# Patient Record
Sex: MALE | Race: WHITE | Employment: FULL TIME | URBAN - METROPOLITAN AREA
[De-identification: names, ages, dates, MRNs, and addresses within clinical notes are randomized per-mention and may not be internally consistent; named-entity substitution may affect disease eponyms.]

---

## 2017-01-23 ENCOUNTER — OFFICE VISIT (OUTPATIENT)
Dept: DERMATOLOGY | Age: 72
End: 2017-01-23

## 2017-01-23 DIAGNOSIS — L57.0 AK (ACTINIC KERATOSIS): ICD-10-CM

## 2017-01-23 DIAGNOSIS — L82.1 SEBORRHEIC KERATOSIS: ICD-10-CM

## 2017-01-23 DIAGNOSIS — L30.9 DERMATITIS: ICD-10-CM

## 2017-01-23 DIAGNOSIS — D22.9 MULTIPLE NEVI: Primary | ICD-10-CM

## 2017-01-23 PROCEDURE — 17003 DESTRUCT PREMALG LES 2-14: CPT | Performed by: DERMATOLOGY

## 2017-01-23 PROCEDURE — 17000 DESTRUCT PREMALG LESION: CPT | Performed by: DERMATOLOGY

## 2017-01-23 PROCEDURE — 99213 OFFICE O/P EST LOW 20 MIN: CPT | Performed by: DERMATOLOGY

## 2017-03-08 DIAGNOSIS — E78.2 MIXED HYPERLIPIDEMIA: Primary | ICD-10-CM

## 2017-03-08 RX ORDER — ATORVASTATIN CALCIUM 80 MG/1
TABLET, FILM COATED ORAL
Qty: 90 TABLET | Refills: 1 | Status: SHIPPED | OUTPATIENT
Start: 2017-03-08 | End: 2017-08-29 | Stop reason: SDUPTHER

## 2017-04-24 DIAGNOSIS — E78.2 MIXED HYPERLIPIDEMIA: ICD-10-CM

## 2017-04-24 RX ORDER — FENOFIBRATE 160 MG/1
160 TABLET ORAL DAILY
Qty: 90 TABLET | Refills: 1 | Status: SHIPPED | OUTPATIENT
Start: 2017-04-24 | End: 2017-10-16 | Stop reason: SDUPTHER

## 2017-05-08 DIAGNOSIS — R73.9 HYPERGLYCEMIA: ICD-10-CM

## 2017-05-08 DIAGNOSIS — E78.2 MIXED HYPERLIPIDEMIA: Primary | ICD-10-CM

## 2017-05-26 DIAGNOSIS — E78.2 MIXED HYPERLIPIDEMIA: ICD-10-CM

## 2017-05-26 DIAGNOSIS — R73.9 HYPERGLYCEMIA: ICD-10-CM

## 2017-05-26 LAB
A/G RATIO: 2.2 (ref 1.1–2.2)
ALBUMIN SERPL-MCNC: 4.4 G/DL (ref 3.4–5)
ALP BLD-CCNC: 48 U/L (ref 40–129)
ALT SERPL-CCNC: 22 U/L (ref 10–40)
ANION GAP SERPL CALCULATED.3IONS-SCNC: 11 MMOL/L (ref 3–16)
AST SERPL-CCNC: 21 U/L (ref 15–37)
BILIRUB SERPL-MCNC: 0.6 MG/DL (ref 0–1)
BUN BLDV-MCNC: 17 MG/DL (ref 7–20)
CALCIUM SERPL-MCNC: 9 MG/DL (ref 8.3–10.6)
CHLORIDE BLD-SCNC: 108 MMOL/L (ref 99–110)
CHOLESTEROL, TOTAL: 127 MG/DL (ref 0–199)
CO2: 26 MMOL/L (ref 21–32)
CREAT SERPL-MCNC: 1 MG/DL (ref 0.8–1.3)
GFR AFRICAN AMERICAN: >60
GFR NON-AFRICAN AMERICAN: >60
GLOBULIN: 2 G/DL
GLUCOSE BLD-MCNC: 117 MG/DL (ref 70–99)
HDLC SERPL-MCNC: 45 MG/DL (ref 40–60)
LDL CHOLESTEROL CALCULATED: 64 MG/DL
POTASSIUM SERPL-SCNC: 4.6 MMOL/L (ref 3.5–5.1)
SODIUM BLD-SCNC: 145 MMOL/L (ref 136–145)
TOTAL PROTEIN: 6.4 G/DL (ref 6.4–8.2)
TRIGL SERPL-MCNC: 92 MG/DL (ref 0–150)
VLDLC SERPL CALC-MCNC: 18 MG/DL

## 2017-05-27 LAB
ESTIMATED AVERAGE GLUCOSE: 137 MG/DL
HBA1C MFR BLD: 6.4 %

## 2017-06-05 ENCOUNTER — OFFICE VISIT (OUTPATIENT)
Dept: FAMILY MEDICINE CLINIC | Age: 72
End: 2017-06-05

## 2017-06-05 VITALS
SYSTOLIC BLOOD PRESSURE: 130 MMHG | BODY MASS INDEX: 30.42 KG/M2 | DIASTOLIC BLOOD PRESSURE: 84 MMHG | WEIGHT: 212 LBS | TEMPERATURE: 97.6 F

## 2017-06-05 DIAGNOSIS — E78.2 MIXED HYPERLIPIDEMIA: ICD-10-CM

## 2017-06-05 DIAGNOSIS — I10 ESSENTIAL HYPERTENSION: ICD-10-CM

## 2017-06-05 DIAGNOSIS — F32.0 MILD SINGLE CURRENT EPISODE OF MAJOR DEPRESSIVE DISORDER (HCC): ICD-10-CM

## 2017-06-05 DIAGNOSIS — E11.9 CONTROLLED TYPE 2 DIABETES MELLITUS WITHOUT COMPLICATION, WITHOUT LONG-TERM CURRENT USE OF INSULIN (HCC): Primary | ICD-10-CM

## 2017-06-05 LAB
CREATININE URINE POCT: NORMAL
MICROALBUMIN/CREAT 24H UR: NORMAL MG/G{CREAT}
MICROALBUMIN/CREAT UR-RTO: NORMAL

## 2017-06-05 PROCEDURE — 82044 UR ALBUMIN SEMIQUANTITATIVE: CPT | Performed by: FAMILY MEDICINE

## 2017-06-05 PROCEDURE — 99214 OFFICE O/P EST MOD 30 MIN: CPT | Performed by: FAMILY MEDICINE

## 2017-06-06 ASSESSMENT — ENCOUNTER SYMPTOMS
EYES NEGATIVE: 1
GASTROINTESTINAL NEGATIVE: 1
RESPIRATORY NEGATIVE: 1

## 2017-06-15 DIAGNOSIS — I10 ESSENTIAL HYPERTENSION: ICD-10-CM

## 2017-07-11 DIAGNOSIS — I10 ESSENTIAL HYPERTENSION: ICD-10-CM

## 2017-08-29 DIAGNOSIS — E78.2 MIXED HYPERLIPIDEMIA: ICD-10-CM

## 2017-08-29 RX ORDER — ATORVASTATIN CALCIUM 80 MG/1
80 TABLET, FILM COATED ORAL DAILY
Qty: 90 TABLET | Refills: 1 | Status: SHIPPED | OUTPATIENT
Start: 2017-08-29 | End: 2018-02-22 | Stop reason: SDUPTHER

## 2017-09-06 DIAGNOSIS — E11.9 CONTROLLED TYPE 2 DIABETES MELLITUS WITHOUT COMPLICATION, WITHOUT LONG-TERM CURRENT USE OF INSULIN (HCC): ICD-10-CM

## 2017-09-06 DIAGNOSIS — E78.2 MIXED HYPERLIPIDEMIA: ICD-10-CM

## 2017-09-06 LAB
A/G RATIO: 2.2 (ref 1.1–2.2)
ALBUMIN SERPL-MCNC: 4.4 G/DL (ref 3.4–5)
ALP BLD-CCNC: 52 U/L (ref 40–129)
ALT SERPL-CCNC: 32 U/L (ref 10–40)
ANION GAP SERPL CALCULATED.3IONS-SCNC: 12 MMOL/L (ref 3–16)
AST SERPL-CCNC: 29 U/L (ref 15–37)
BILIRUB SERPL-MCNC: 0.8 MG/DL (ref 0–1)
BUN BLDV-MCNC: 19 MG/DL (ref 7–20)
CALCIUM SERPL-MCNC: 9.2 MG/DL (ref 8.3–10.6)
CHLORIDE BLD-SCNC: 106 MMOL/L (ref 99–110)
CHOLESTEROL, TOTAL: 127 MG/DL (ref 0–199)
CO2: 26 MMOL/L (ref 21–32)
CREAT SERPL-MCNC: 0.9 MG/DL (ref 0.8–1.3)
ESTIMATED AVERAGE GLUCOSE: 122.6 MG/DL
GFR AFRICAN AMERICAN: >60
GFR NON-AFRICAN AMERICAN: >60
GLOBULIN: 2 G/DL
GLUCOSE BLD-MCNC: 96 MG/DL (ref 70–99)
HBA1C MFR BLD: 5.9 %
HDLC SERPL-MCNC: 48 MG/DL (ref 40–60)
LDL CHOLESTEROL CALCULATED: 65 MG/DL
POTASSIUM SERPL-SCNC: 4.7 MMOL/L (ref 3.5–5.1)
SODIUM BLD-SCNC: 144 MMOL/L (ref 136–145)
TOTAL PROTEIN: 6.4 G/DL (ref 6.4–8.2)
TRIGL SERPL-MCNC: 71 MG/DL (ref 0–150)
VLDLC SERPL CALC-MCNC: 14 MG/DL

## 2017-09-19 ENCOUNTER — OFFICE VISIT (OUTPATIENT)
Dept: FAMILY MEDICINE CLINIC | Age: 72
End: 2017-09-19

## 2017-09-19 VITALS
TEMPERATURE: 97.5 F | DIASTOLIC BLOOD PRESSURE: 62 MMHG | HEIGHT: 66 IN | BODY MASS INDEX: 31.63 KG/M2 | WEIGHT: 196.8 LBS | HEART RATE: 70 BPM | OXYGEN SATURATION: 97 % | SYSTOLIC BLOOD PRESSURE: 130 MMHG

## 2017-09-19 DIAGNOSIS — L30.9 ECZEMA, UNSPECIFIED TYPE: Primary | ICD-10-CM

## 2017-09-19 DIAGNOSIS — I10 ESSENTIAL HYPERTENSION: ICD-10-CM

## 2017-09-19 DIAGNOSIS — R73.9 HYPERGLYCEMIA: ICD-10-CM

## 2017-09-19 DIAGNOSIS — E78.2 MIXED HYPERLIPIDEMIA: ICD-10-CM

## 2017-09-19 DIAGNOSIS — Z23 NEED FOR 23-POLYVALENT PNEUMOCOCCAL POLYSACCHARIDE VACCINE: ICD-10-CM

## 2017-09-19 DIAGNOSIS — Z23 NEED FOR INFLUENZA VACCINATION: ICD-10-CM

## 2017-09-19 PROCEDURE — 90732 PPSV23 VACC 2 YRS+ SUBQ/IM: CPT | Performed by: NURSE PRACTITIONER

## 2017-09-19 PROCEDURE — 90472 IMMUNIZATION ADMIN EACH ADD: CPT | Performed by: NURSE PRACTITIONER

## 2017-09-19 PROCEDURE — 90471 IMMUNIZATION ADMIN: CPT | Performed by: NURSE PRACTITIONER

## 2017-09-19 PROCEDURE — 90662 IIV NO PRSV INCREASED AG IM: CPT | Performed by: NURSE PRACTITIONER

## 2017-09-19 PROCEDURE — 99214 OFFICE O/P EST MOD 30 MIN: CPT | Performed by: NURSE PRACTITIONER

## 2017-09-19 RX ORDER — MOMETASONE FUROATE 1 MG/G
OINTMENT TOPICAL
Qty: 1 TUBE | Refills: 1 | Status: SHIPPED | OUTPATIENT
Start: 2017-09-19

## 2017-09-20 ASSESSMENT — ENCOUNTER SYMPTOMS
SHORTNESS OF BREATH: 0
ALLERGIC/IMMUNOLOGIC NEGATIVE: 1
ORTHOPNEA: 0
BLURRED VISION: 0
VOMITING: 0
EYES NEGATIVE: 1
RESPIRATORY NEGATIVE: 1
SORE THROAT: 0
EYE PAIN: 0
RHINORRHEA: 0
GASTROINTESTINAL NEGATIVE: 1
COUGH: 0
NAIL CHANGES: 0
DIARRHEA: 0

## 2017-10-16 DIAGNOSIS — E78.2 MIXED HYPERLIPIDEMIA: ICD-10-CM

## 2017-10-16 RX ORDER — FENOFIBRATE 160 MG/1
160 TABLET ORAL DAILY
Qty: 90 TABLET | Refills: 1 | Status: SHIPPED | OUTPATIENT
Start: 2017-10-16 | End: 2018-04-13 | Stop reason: SDUPTHER

## 2017-10-17 ENCOUNTER — PATIENT MESSAGE (OUTPATIENT)
Dept: FAMILY MEDICINE CLINIC | Age: 72
End: 2017-10-17

## 2017-10-18 RX ORDER — NITROGLYCERIN 400 UG/1
SPRAY ORAL
Qty: 1 BOTTLE | Refills: 5 | Status: SHIPPED | OUTPATIENT
Start: 2017-10-18 | End: 2017-10-24 | Stop reason: CLARIF

## 2017-10-24 ENCOUNTER — TELEPHONE (OUTPATIENT)
Dept: FAMILY MEDICINE CLINIC | Age: 72
End: 2017-10-24

## 2017-10-24 DIAGNOSIS — I25.10 CORONARY ARTERY DISEASE INVOLVING NATIVE CORONARY ARTERY OF NATIVE HEART WITHOUT ANGINA PECTORIS: Primary | ICD-10-CM

## 2017-10-24 RX ORDER — NITROGLYCERIN 0.3 MG/1
0.3 TABLET SUBLINGUAL EVERY 5 MIN PRN
Qty: 30 TABLET | Refills: 3 | Status: SHIPPED | OUTPATIENT
Start: 2017-10-24 | End: 2018-07-17 | Stop reason: SDUPTHER

## 2017-10-24 NOTE — TELEPHONE ENCOUNTER
Fax request from Pharmacy requesting alternative to the nitroglycerin spray. It is not covered by [employer] and costs more than $100. Is there an alternative?   Thanks

## 2017-10-25 ENCOUNTER — TELEPHONE (OUTPATIENT)
Dept: FAMILY MEDICINE CLINIC | Age: 72
End: 2017-10-25

## 2017-10-25 DIAGNOSIS — I25.10 CORONARY ARTERY DISEASE INVOLVING NATIVE CORONARY ARTERY OF NATIVE HEART WITHOUT ANGINA PECTORIS: Primary | ICD-10-CM

## 2017-10-25 NOTE — TELEPHONE ENCOUNTER
[facility] - Neto Euceda MD  [address] [place], [facility] [address] [facility], [place], [address]  Ph: [phone]  One of the many advantages of the [facility] [address] is that we all work together closely to make healthcare easy for you. We have contacted the physician practice to inform them we have referred you.  For your convenience, their office should call you within a few days to schedule   an appointment, but if you would like to call them sooner their information is above    Pt's wife [email or web address]

## 2017-11-27 ENCOUNTER — TELEPHONE (OUTPATIENT)
Dept: DERMATOLOGY | Age: 72
End: 2017-11-27

## 2017-11-27 NOTE — TELEPHONE ENCOUNTER
Vishnu Godinez called to ask if he can get in to see Dr. Aurelia Pinto for an itchy rash on both of his legs that is spreading. He isn't scheduled for his yearly until January.  [phone]    He also requested a refill on triamcinolone (KENALOG) 0.1 % cream.   Pharmacy:  [facility] - P [phone]

## 2017-11-29 NOTE — TELEPHONE ENCOUNTER
Patient scheduled for rash on his arms, legs and some on back for quite awhile and seems to be spreading to his hands.

## 2017-11-30 ENCOUNTER — OFFICE VISIT (OUTPATIENT)
Dept: DERMATOLOGY | Age: 72
End: 2017-11-30

## 2017-11-30 VITALS — BODY MASS INDEX: 31.47 KG/M2 | WEIGHT: 195 LBS

## 2017-11-30 DIAGNOSIS — L30.9 DERMATITIS: Primary | ICD-10-CM

## 2017-11-30 PROCEDURE — 1036F TOBACCO NON-USER: CPT | Performed by: DERMATOLOGY

## 2017-11-30 PROCEDURE — G8598 ASA/ANTIPLAT THER USED: HCPCS | Performed by: DERMATOLOGY

## 2017-11-30 PROCEDURE — 4040F PNEUMOC VAC/ADMIN/RCVD: CPT | Performed by: DERMATOLOGY

## 2017-11-30 PROCEDURE — 96372 THER/PROPH/DIAG INJ SC/IM: CPT | Performed by: DERMATOLOGY

## 2017-11-30 PROCEDURE — 3017F COLORECTAL CA SCREEN DOC REV: CPT | Performed by: DERMATOLOGY

## 2017-11-30 PROCEDURE — 1123F ACP DISCUSS/DSCN MKR DOCD: CPT | Performed by: DERMATOLOGY

## 2017-11-30 PROCEDURE — G8417 CALC BMI ABV UP PARAM F/U: HCPCS | Performed by: DERMATOLOGY

## 2017-11-30 PROCEDURE — G8427 DOCREV CUR MEDS BY ELIG CLIN: HCPCS | Performed by: DERMATOLOGY

## 2017-11-30 PROCEDURE — G8484 FLU IMMUNIZE NO ADMIN: HCPCS | Performed by: DERMATOLOGY

## 2017-11-30 PROCEDURE — 99213 OFFICE O/P EST LOW 20 MIN: CPT | Performed by: DERMATOLOGY

## 2017-11-30 RX ORDER — TRIAMCINOLONE ACETONIDE 40 MG/ML
80 INJECTION, SUSPENSION INTRA-ARTICULAR; INTRAMUSCULAR ONCE
Status: SHIPPED | OUTPATIENT
Start: 2017-11-30

## 2017-11-30 NOTE — PROGRESS NOTES
[facility]  Willma Cooks, MD  [address]  1945    71 y.o. male     Date of Visit: 2017    Chief Complaint: moles, f/u AK's  Chief Complaint   Patient presents with    Rash     x 1.5 mos ago arms, legs, back, feet really itchy-prev treated with triamcinolone ()     Last seen:   *continues to have a rough year since last seen - his wife has parkinson's and has had probs with restless legs     History of Present Illness:    Here for ~ 2 month hx of pruritic eruption on the legs, forearms, L and R abdomen and back. No improvement with mometasone ointment. No other trx tried. Not using moisturizer. Has had asteatotic and nummular dermatitis flares in the past.  Atopic dermatitis as a child/teen. Previously has used TAC cream intermittently for flares since last seen with excellent results - improves quickly. No tanning beds. He wears sunscreen regularly. He had a lot of sun exposure as a child and numerous sunburns that he can recall. Review of Systems:  Gen: Feels well, good sense of health. No F/C. Skin: No changing moles or lesions. Past Medical History, Family History, Surgical History, Medications and Allergies reviewed.     Past Medical History:   Diagnosis Date    Asthma     BPH     CAD (coronary artery disease)     Depression     Hyperlipidemia     Hypertension     Mononucleosis        Past Surgical History:   Procedure Laterality Date    ANGIOPLASTY      TONSILLECTOMY AND ADENOIDECTOMY         Outpatient Prescriptions Marked as Taking for the 17 encounter (Office Visit) with Krissy Suazo MD   Medication Sig Dispense Refill    fenofibrate 160 MG tablet Take 1 tablet by mouth daily 90 tablet 1    atorvastatin (LIPITOR) 80 MG tablet Take 1 tablet by mouth daily 90 tablet 1    metoprolol tartrate (LOPRESSOR) 25 MG tablet Take 0.5 tablets by mouth daily 45 tablet 1    Multiple Vitamins-Minerals (THERAPEUTIC MULTIVITAMIN-MINERALS) tablet Take 1 tablet by mouth daily      Vitamin D (CHOLECALCIFEROL) 1000 UNITS CAPS capsule Take 2,000 Units by mouth daily      aspirin 325 MG tablet Take 325 mg by mouth daily. Allergies   Allergen Reactions    Ace Inhibitors          Physical Examination     Gen, well-appearing  The following were examined and determined to be normal: Psych/Neuro, head/face Gums/teeth/lips, Neck, Breast/axilla/chest, Nails/digits and buttocks. The following were examined and determined to be abnormal: RLE, LLE, RUE, LUE, abdomen, back    Distal > proximal LE, arms, torso/back with erythematous eczematous round patches/plaques and crackled/superficially fissured erythematous patches on the shins/ankles    Assessment and Plan     1. Dermatitisnummular and asteatotic - flaring today  - Discussed dry skin care and encouraged daily use of moisturizer  - IM Kenalog today 80 mg  - ed risk mood/appetite/sleep probs  - TAC oint bid to affected areas until clear and prn flares; ed se/misuse    Has FSE scheduled for Jan 2018. Wife is on Humira - had seen another derm but retired - would like her to come here.

## 2017-11-30 NOTE — PATIENT INSTRUCTIONS
DRY SKIN CARE    1. Do not take more than 1 shower or bath each day. Try to spend less than 10 minutes in the shower/bath. 2. Use a moisturizing soap such as Dove, Oil of Olay or Cetaphil. Antibacterial soaps can be too drying. 3. Use soap only on limited areas (face, underarms, groin). Try to avoid using soap on the arms, legs, trunk and back. 4. After showering, pat dry with a towel and generously apply a thick moisturizer all over. 5. If you are able, apply the moisturizer a second time during the day as well. 6. If a prescription cream or ointment has been ordered for you, apply the prescription medication to affected areas after your bath/shower while the skin is still damp, then apply the moisturizer as above. 7. When it comes to moisturizers, the thicker the better. Ointments (such as vaseline) are thicker than creams, and creams are thicker than lotions.  Suggested over-the-counter moisturizers include:    · CeraVe Cream  · Cetaphil Cream  · Lubriderm Cream  · Vaseline (petroleum jelly)  · Aquaphor  · Eucerin Cream  · Neutrogena Moisturizing Cream  · Neutrogena Hand Cream  · Aveeno Moisturizing Cream or Lotion

## 2017-12-01 DIAGNOSIS — F32.0 MILD SINGLE CURRENT EPISODE OF MAJOR DEPRESSIVE DISORDER (HCC): ICD-10-CM

## 2017-12-01 RX ORDER — BUPROPION HYDROCHLORIDE 150 MG/1
150 TABLET ORAL EVERY MORNING
Qty: 90 TABLET | Refills: 1 | Status: SHIPPED | OUTPATIENT
Start: 2017-12-01 | End: 2018-04-17 | Stop reason: SDUPTHER

## 2017-12-01 NOTE — TELEPHONE ENCOUNTER
Pt feels he needs to be back on Generic Wellbutrin so he wants a refill  Advised pt Dr would like to see him to discuss before going back on it  He has an appt 12.19.17 @ 5pm but doesn't feel he can wait that long to get back on it and would like to talk directly to Dr Aaron Mendez as soon as possible  Please call pt at earliest convenience  If applicable please use the pharmacy listed

## 2017-12-01 NOTE — TELEPHONE ENCOUNTER
I sent in the medication. Continue all your medications, treatments and keep your follow up appointments. Contact the office if you have any questions.

## 2017-12-14 DIAGNOSIS — E78.2 MIXED HYPERLIPIDEMIA: ICD-10-CM

## 2017-12-14 DIAGNOSIS — R73.9 HYPERGLYCEMIA: ICD-10-CM

## 2017-12-14 LAB
ALBUMIN SERPL-MCNC: 4.4 G/DL (ref 3.4–5)
ANION GAP SERPL CALCULATED.3IONS-SCNC: 13 MMOL/L (ref 3–16)
BUN BLDV-MCNC: 22 MG/DL (ref 7–20)
CALCIUM SERPL-MCNC: 9.7 MG/DL (ref 8.3–10.6)
CHLORIDE BLD-SCNC: 104 MMOL/L (ref 99–110)
CHOLESTEROL, TOTAL: 121 MG/DL (ref 0–199)
CO2: 27 MMOL/L (ref 21–32)
CREAT SERPL-MCNC: 1.1 MG/DL (ref 0.8–1.3)
GFR AFRICAN AMERICAN: >60
GFR NON-AFRICAN AMERICAN: >60
GLUCOSE BLD-MCNC: 94 MG/DL (ref 70–99)
HDLC SERPL-MCNC: 57 MG/DL (ref 40–60)
LDL CHOLESTEROL CALCULATED: 55 MG/DL
PHOSPHORUS: 4.3 MG/DL (ref 2.5–4.9)
POTASSIUM SERPL-SCNC: 4.6 MMOL/L (ref 3.5–5.1)
SODIUM BLD-SCNC: 144 MMOL/L (ref 136–145)
TRIGL SERPL-MCNC: 43 MG/DL (ref 0–150)
VLDLC SERPL CALC-MCNC: 9 MG/DL

## 2017-12-15 LAB
ESTIMATED AVERAGE GLUCOSE: 122.6 MG/DL
HBA1C MFR BLD: 5.9 %

## 2017-12-19 ENCOUNTER — OFFICE VISIT (OUTPATIENT)
Dept: FAMILY MEDICINE CLINIC | Age: 72
End: 2017-12-19

## 2017-12-19 VITALS
BODY MASS INDEX: 30.73 KG/M2 | SYSTOLIC BLOOD PRESSURE: 134 MMHG | DIASTOLIC BLOOD PRESSURE: 82 MMHG | TEMPERATURE: 97.3 F | WEIGHT: 190.4 LBS

## 2017-12-19 DIAGNOSIS — N52.9 ERECTILE DYSFUNCTION, UNSPECIFIED ERECTILE DYSFUNCTION TYPE: Primary | ICD-10-CM

## 2017-12-19 DIAGNOSIS — F32.0 MILD SINGLE CURRENT EPISODE OF MAJOR DEPRESSIVE DISORDER (HCC): ICD-10-CM

## 2017-12-19 DIAGNOSIS — I10 ESSENTIAL HYPERTENSION: ICD-10-CM

## 2017-12-19 DIAGNOSIS — I25.10 CORONARY ARTERY DISEASE INVOLVING NATIVE CORONARY ARTERY OF NATIVE HEART WITHOUT ANGINA PECTORIS: ICD-10-CM

## 2017-12-19 DIAGNOSIS — E78.2 MIXED HYPERLIPIDEMIA: ICD-10-CM

## 2017-12-19 PROCEDURE — 3017F COLORECTAL CA SCREEN DOC REV: CPT | Performed by: FAMILY MEDICINE

## 2017-12-19 PROCEDURE — G8484 FLU IMMUNIZE NO ADMIN: HCPCS | Performed by: FAMILY MEDICINE

## 2017-12-19 PROCEDURE — 1036F TOBACCO NON-USER: CPT | Performed by: FAMILY MEDICINE

## 2017-12-19 PROCEDURE — 99214 OFFICE O/P EST MOD 30 MIN: CPT | Performed by: FAMILY MEDICINE

## 2017-12-19 PROCEDURE — G8427 DOCREV CUR MEDS BY ELIG CLIN: HCPCS | Performed by: FAMILY MEDICINE

## 2017-12-19 PROCEDURE — G8598 ASA/ANTIPLAT THER USED: HCPCS | Performed by: FAMILY MEDICINE

## 2017-12-19 PROCEDURE — 1123F ACP DISCUSS/DSCN MKR DOCD: CPT | Performed by: FAMILY MEDICINE

## 2017-12-19 PROCEDURE — 4040F PNEUMOC VAC/ADMIN/RCVD: CPT | Performed by: FAMILY MEDICINE

## 2017-12-19 PROCEDURE — G8417 CALC BMI ABV UP PARAM F/U: HCPCS | Performed by: FAMILY MEDICINE

## 2017-12-19 ASSESSMENT — ENCOUNTER SYMPTOMS
GASTROINTESTINAL NEGATIVE: 1
EYES NEGATIVE: 1
RESPIRATORY NEGATIVE: 1

## 2017-12-20 NOTE — PROGRESS NOTES
following side effects from the treatment: none. Vitals:    12/19/17 1648   BP: 134/82   Temp: 97.3 °F (36.3 °C)         Immunization History   Administered Date(s) Administered    Influenza Virus Vaccine 10/13/2010, 10/05/2011, 10/29/2014, 10/07/2015    Influenza Whole 09/03/2009    Influenza, High Dose 09/03/2009, 10/07/2015, 09/18/2016, 09/19/2017    Pneumococcal 13-valent Conjugate (Ecwiqoh84) 06/14/2016    Pneumococcal Polysaccharide ([id number]) 09/19/2017    Tdap (Boostrix, Adacel) 11/23/2015    Zoster 07/02/2013       Allergies   Allergen Reactions    Ace Inhibitors      Outpatient Prescriptions Marked as Taking for the 12/19/17 encounter (Office Visit) with López Ng MD   Medication Sig Dispense Refill    buPROPion (WELLBUTRIN XL) 150 MG extended release tablet Take 1 tablet by mouth every morning 90 tablet 1    triamcinolone (KENALOG) 0.1 % ointment Apply to affected area BID PRN flares 450 g 1    nitroGLYCERIN (NITROSTAT) 0.3 MG SL tablet Place 1 tablet under the tongue every 5 minutes as needed for Chest pain up to max of 3 total doses. If no relief after 1 dose, call 911. 30 tablet 3    fenofibrate 160 MG tablet Take 1 tablet by mouth daily 90 tablet 1    mometasone (ELOCON) 0.1 % ointment Apply topically daily. 1 Tube 1    atorvastatin (LIPITOR) 80 MG tablet Take 1 tablet by mouth daily 90 tablet 1    metoprolol tartrate (LOPRESSOR) 25 MG tablet Take 0.5 tablets by mouth daily 45 tablet 1    Multiple Vitamins-Minerals (THERAPEUTIC MULTIVITAMIN-MINERALS) tablet Take 1 tablet by mouth daily      triamcinolone (KENALOG) 0.1 % cream Apply to affected area BID PRN flares 450 g 1    Vitamin D (CHOLECALCIFEROL) 1000 UNITS CAPS capsule Take 2,000 Units by mouth daily      aspirin 325 MG tablet Take 325 mg by mouth daily.  albuterol (PROVENTIL HFA;VENTOLIN HFA) 108 (90 BASE) MCG/ACT inhaler Inhale 2 puffs into the lungs every 6 hours as needed.  1 Inhaler 3       Past Medical History:   Diagnosis Date    Asthma     BPH     CAD (coronary artery disease)     Depression     Hyperlipidemia     Hypertension     Mononucleosis      Past Surgical History:   Procedure Laterality Date    ANGIOPLASTY  2004    TONSILLECTOMY AND ADENOIDECTOMY       Family History   Problem Relation Age of Onset    Diabetes Father     Heart Disease Father     Asthma Sister     Diabetes Maternal Grandfather     Heart Disease Maternal Grandfather      Social History     Social History    Marital status:      Spouse name: N/A    Number of children: N/A    Years of education: N/A     Occupational History    Not on file. Social History Main Topics    Smoking status: Never Smoker    Smokeless tobacco: Never Used    Alcohol use Yes    Drug use: Unknown    Sexual activity: Not on file     Other Topics Concern    Not on file     Social History Narrative    No narrative on file         Any recent diagnostic tests, hospital reports, office notes or consultation letters were reviewed prior to and during the visit. Review of Systems   Constitutional: Negative. HENT: Negative. Eyes: Negative. Respiratory: Negative. Cardiovascular: Negative. Gastrointestinal: Negative. Genitourinary: Negative. Musculoskeletal: Negative. Psychiatric/Behavioral: Negative. Physical Exam   Constitutional: He appears well-developed and well-nourished. No distress. Neck: Normal range of motion. Neck supple. Normal carotid pulses, no hepatojugular reflux and no JVD present. Carotid bruit is not present. No tracheal deviation present. No thyromegaly present. Cardiovascular: Normal rate, regular rhythm, S2 normal, normal heart sounds and intact distal pulses. PMI is not displaced. Exam reveals no gallop and no friction rub. No murmur heard. Pulses:       Carotid pulses are 2+ on the right side, and 2+ on the left side.        Dorsalis pedis pulses are 2+ on the right side, and

## 2017-12-26 ENCOUNTER — OFFICE VISIT (OUTPATIENT)
Dept: FAMILY MEDICINE CLINIC | Age: 72
End: 2017-12-26

## 2017-12-26 VITALS
WEIGHT: 192.2 LBS | BODY MASS INDEX: 27.52 KG/M2 | HEIGHT: 70 IN | TEMPERATURE: 97.5 F | DIASTOLIC BLOOD PRESSURE: 82 MMHG | SYSTOLIC BLOOD PRESSURE: 116 MMHG

## 2017-12-26 DIAGNOSIS — J40 BRONCHITIS: ICD-10-CM

## 2017-12-26 DIAGNOSIS — J11.1 INFLUENZA: Primary | ICD-10-CM

## 2017-12-26 PROCEDURE — 1036F TOBACCO NON-USER: CPT | Performed by: FAMILY MEDICINE

## 2017-12-26 PROCEDURE — G8417 CALC BMI ABV UP PARAM F/U: HCPCS | Performed by: FAMILY MEDICINE

## 2017-12-26 PROCEDURE — G8598 ASA/ANTIPLAT THER USED: HCPCS | Performed by: FAMILY MEDICINE

## 2017-12-26 PROCEDURE — 4040F PNEUMOC VAC/ADMIN/RCVD: CPT | Performed by: FAMILY MEDICINE

## 2017-12-26 PROCEDURE — G8484 FLU IMMUNIZE NO ADMIN: HCPCS | Performed by: FAMILY MEDICINE

## 2017-12-26 PROCEDURE — 1123F ACP DISCUSS/DSCN MKR DOCD: CPT | Performed by: FAMILY MEDICINE

## 2017-12-26 PROCEDURE — 99213 OFFICE O/P EST LOW 20 MIN: CPT | Performed by: FAMILY MEDICINE

## 2017-12-26 PROCEDURE — 3017F COLORECTAL CA SCREEN DOC REV: CPT | Performed by: FAMILY MEDICINE

## 2017-12-26 PROCEDURE — G8427 DOCREV CUR MEDS BY ELIG CLIN: HCPCS | Performed by: FAMILY MEDICINE

## 2017-12-26 RX ORDER — ALBUTEROL SULFATE 90 UG/1
2 AEROSOL, METERED RESPIRATORY (INHALATION) EVERY 6 HOURS PRN
Qty: 1 INHALER | Refills: 1 | Status: SHIPPED | OUTPATIENT
Start: 2017-12-26 | End: 2022-02-16

## 2017-12-26 RX ORDER — ECHINACEA 400 MG
CAPSULE ORAL
COMMUNITY

## 2017-12-26 RX ORDER — DOXYCYCLINE HYCLATE 100 MG/1
100 CAPSULE ORAL 2 TIMES DAILY
Qty: 20 CAPSULE | Refills: 0 | Status: SHIPPED | OUTPATIENT
Start: 2017-12-26 | End: 2018-01-05

## 2017-12-26 ASSESSMENT — ENCOUNTER SYMPTOMS
RESPIRATORY NEGATIVE: 1
EYES NEGATIVE: 1
GASTROINTESTINAL NEGATIVE: 1

## 2017-12-27 NOTE — PROGRESS NOTES
12/26/17    Milton Guardado  1945      Chief Complaint   Patient presents with    Congestion     nasal w/clear x sat am.    Cough     dry x sat.  Fever     x sat up to 102. 9. now gone.  Asthma     flared with congestion relieved w/inhaler. Upper Respiratory Infection: Patient complains of symptoms of a URI. Symptoms include . Onset of symptoms was   ago, gradually improving since that time. He also c/o achiness, congestion, fever with Tmax to 100.5-101.9, nasal congestion, non productive cough, post nasal drip, sinus pressure and wheezing for the past 3 days . He is drinking plenty of fluids. Evaluation to date: none. Treatment to date: oral decongestant, OTC cough suppressant, which has been  effective.           Vitals:    12/26/17 1751   BP: 116/82   Temp: 97.5 °F (36.4 °C)         Immunization History   Administered Date(s) Administered    Influenza Virus Vaccine 10/13/2010, 10/05/2011, 10/29/2014, 10/07/2015    Influenza Whole 09/03/2009    Influenza, High Dose 09/03/2009, 10/07/2015, 09/18/2016, 09/19/2017    Pneumococcal 13-valent Conjugate ([id number]) 06/14/2016    Pneumococcal Polysaccharide (Qfwbkyfoz37) 09/19/2017    Tdap (Boostrix, Adacel) 11/23/2015    Zoster 07/02/2013       Allergies   Allergen Reactions    Ace Inhibitors      Outpatient Prescriptions Marked as Taking for the 12/26/17 encounter (Office Visit) with Sabino Olszewski, MD   Medication Sig Dispense Refill    Flaxseed, Linseed, (FLAXSEED OIL) 1000 MG CAPS Take by mouth      Coenzyme Q10 (Q-10 CO-ENZYME PO) Take by mouth      doxycycline hyclate (VIBRAMYCIN) 100 MG capsule Take 1 capsule by mouth 2 times daily for 10 days 20 capsule 0    albuterol sulfate HFA (PROVENTIL HFA) 108 (90 Base) MCG/ACT inhaler Inhale 2 puffs into the lungs every 6 hours as needed for Wheezing MAY SUBSTITUTE PER INSURANCE 1 Inhaler 1    buPROPion (WELLBUTRIN XL) 150 MG extended release tablet Take 1 tablet by mouth every morning 90

## 2018-01-23 ENCOUNTER — OFFICE VISIT (OUTPATIENT)
Dept: DERMATOLOGY | Age: 73
End: 2018-01-23

## 2018-01-23 DIAGNOSIS — L30.9 DERMATITIS: ICD-10-CM

## 2018-01-23 DIAGNOSIS — L57.0 AK (ACTINIC KERATOSIS): ICD-10-CM

## 2018-01-23 DIAGNOSIS — L82.1 SEBORRHEIC KERATOSIS: ICD-10-CM

## 2018-01-23 DIAGNOSIS — D22.9 MULTIPLE NEVI: Primary | ICD-10-CM

## 2018-01-23 PROCEDURE — 4040F PNEUMOC VAC/ADMIN/RCVD: CPT | Performed by: DERMATOLOGY

## 2018-01-23 PROCEDURE — G8417 CALC BMI ABV UP PARAM F/U: HCPCS | Performed by: DERMATOLOGY

## 2018-01-23 PROCEDURE — G8427 DOCREV CUR MEDS BY ELIG CLIN: HCPCS | Performed by: DERMATOLOGY

## 2018-01-23 PROCEDURE — 17000 DESTRUCT PREMALG LESION: CPT | Performed by: DERMATOLOGY

## 2018-01-23 PROCEDURE — 17003 DESTRUCT PREMALG LES 2-14: CPT | Performed by: DERMATOLOGY

## 2018-01-23 PROCEDURE — 99214 OFFICE O/P EST MOD 30 MIN: CPT | Performed by: DERMATOLOGY

## 2018-01-23 PROCEDURE — G8484 FLU IMMUNIZE NO ADMIN: HCPCS | Performed by: DERMATOLOGY

## 2018-01-23 PROCEDURE — 3017F COLORECTAL CA SCREEN DOC REV: CPT | Performed by: DERMATOLOGY

## 2018-01-23 PROCEDURE — 1036F TOBACCO NON-USER: CPT | Performed by: DERMATOLOGY

## 2018-01-23 PROCEDURE — G8598 ASA/ANTIPLAT THER USED: HCPCS | Performed by: DERMATOLOGY

## 2018-01-23 PROCEDURE — 1123F ACP DISCUSS/DSCN MKR DOCD: CPT | Performed by: DERMATOLOGY

## 2018-01-23 NOTE — PATIENT INSTRUCTIONS
Cryosurgery (Freezing) Wound Care Instructions    AFTER THE PROCEDURE:    You will notice swelling and redness around the site. This is normal.    You may experience a sharp or sore feeling for the next several days. For this discomfort, you may take acetaminophen (Tylenol©).  A blister may develop at the treated area, sometimes as soon as by the end of the day. After several days, the blister will subside and a scab will form.  If the area is bumped or traumatized during the first few days following freezing, you may develop bleeding into the blister, forming a blood blister. This is nothing to be alarmed about.  If the blister is tense, uncomfortable, or much larger than the site that was frozen, you may pop the blister along its edge with a sterile needle (boiled, heated under a flame, or cleaned with alcohol) to allow the fluid to drain out. If the blister does not bother you, no treatment is needed.  Do NOT peel off the top of the blister roof. It will act as a dressing on top of your wound. WOUND CARE:    You may shower or bathe as usual, but avoid scrubbing the areas that have been frozen.  Cleanse the site twice a day with mild soapy water, and then apply a thin film of white petrolatum (Vaseline©).  You do not need to cover the area, but can if you prefer.  Do NOT allow the site to become dry or crusted, or attempt to dry it out with rubbing alcohol or hydrogen peroxide.  Continue this regimen until the area is pink and healed. Depending on the size and location of your cryosurgery site, healing may take 2 to 4 weeks.  The area may continue to be pink for several weeks, and over the next few months may become darker or lighter than the surrounding skin. This may be a permanent change. Protecting Yourself From the Sun    · Apply broad spectrum water resistant sunscreen with an SPF of at least 30 to exposed areas of the skin. Dont forget the ears and lips!  Remember to

## 2018-01-23 NOTE — PROGRESS NOTES
[facility]  Antionette Portillo MD  [address]  1945    71 y.o. male     Date of Visit: 1/23/2018    Chief Complaint: moles, f/u AK's  Chief Complaint   Patient presents with    Skin Exam     Last seen:  for dermatitis and IM Kenalog  *rough year since last seen - his wife has parkinson's and has had probs with restless legs   *his wife has been hospitalized since beginning of Jan after fall and small subarachnoid hemorrhage    History of Present Illness:    1. Here for evaluation of multiple asx pigmented lesions on the trunk and extremities, present for many years; no change in size/shape/color of any lesions; no bleeding lesions. No personal or family history of skin cancer. 2. History of eczema during childhood and more recent flares of nummular dermatitis  - cleared after last seen with IM Kenalog and topical steroids. Has used TAC cream intermittently for flares since last seen with excellent results - improves quickly. Has a few focal flared areas on the back and arm recently. 3.  He has a few rough lesions on the face and scalp. He has no personal history or family history of skin cancer. No tanning beds. He wears sunscreen regularly. He had a lot of sun exposure as a child and numerous sunburns that he can recall. Review of Systems:  Gen: Feels well, good sense of health. Skin: No changing moles or lesions. No new rashes. Past Medical History, Family History, Surgical History, Medications and Allergies reviewed.     Past Medical History:   Diagnosis Date    Asthma     BPH     CAD (coronary artery disease)     Depression     Hyperlipidemia     Hypertension     Mononucleosis        Past Surgical History:   Procedure Laterality Date    ANGIOPLASTY  2004    TONSILLECTOMY AND ADENOIDECTOMY         Outpatient Prescriptions Marked as Taking for the 1/23/18 encounter (Office Visit) with Linda Hallman MD   Medication Sig

## 2018-02-22 DIAGNOSIS — E78.2 MIXED HYPERLIPIDEMIA: ICD-10-CM

## 2018-02-22 RX ORDER — ATORVASTATIN CALCIUM 80 MG/1
80 TABLET, FILM COATED ORAL DAILY
Qty: 90 TABLET | Refills: 1 | Status: SHIPPED | OUTPATIENT
Start: 2018-02-22 | End: 2018-07-17 | Stop reason: SDUPTHER

## 2018-02-28 DIAGNOSIS — I10 ESSENTIAL HYPERTENSION: ICD-10-CM

## 2018-04-13 DIAGNOSIS — E78.2 MIXED HYPERLIPIDEMIA: ICD-10-CM

## 2018-04-13 RX ORDER — FENOFIBRATE 160 MG/1
160 TABLET ORAL DAILY
Qty: 90 TABLET | Refills: 1 | Status: SHIPPED | OUTPATIENT
Start: 2018-04-13 | End: 2018-07-17 | Stop reason: SDUPTHER

## 2018-04-17 ENCOUNTER — OFFICE VISIT (OUTPATIENT)
Dept: FAMILY MEDICINE CLINIC | Age: 73
End: 2018-04-17

## 2018-04-17 VITALS
OXYGEN SATURATION: 99 % | TEMPERATURE: 97.3 F | WEIGHT: 187 LBS | DIASTOLIC BLOOD PRESSURE: 70 MMHG | HEART RATE: 61 BPM | HEIGHT: 70 IN | SYSTOLIC BLOOD PRESSURE: 120 MMHG | BODY MASS INDEX: 26.77 KG/M2

## 2018-04-17 DIAGNOSIS — R73.9 HYPERGLYCEMIA: Primary | ICD-10-CM

## 2018-04-17 DIAGNOSIS — I25.10 CORONARY ARTERY DISEASE INVOLVING NATIVE CORONARY ARTERY OF NATIVE HEART WITHOUT ANGINA PECTORIS: ICD-10-CM

## 2018-04-17 DIAGNOSIS — E78.2 MIXED HYPERLIPIDEMIA: ICD-10-CM

## 2018-04-17 DIAGNOSIS — F32.0 MILD SINGLE CURRENT EPISODE OF MAJOR DEPRESSIVE DISORDER (HCC): ICD-10-CM

## 2018-04-17 DIAGNOSIS — I10 ESSENTIAL HYPERTENSION: ICD-10-CM

## 2018-04-17 PROCEDURE — G8427 DOCREV CUR MEDS BY ELIG CLIN: HCPCS | Performed by: FAMILY MEDICINE

## 2018-04-17 PROCEDURE — G8417 CALC BMI ABV UP PARAM F/U: HCPCS | Performed by: FAMILY MEDICINE

## 2018-04-17 PROCEDURE — G8598 ASA/ANTIPLAT THER USED: HCPCS | Performed by: FAMILY MEDICINE

## 2018-04-17 PROCEDURE — 1036F TOBACCO NON-USER: CPT | Performed by: FAMILY MEDICINE

## 2018-04-17 PROCEDURE — 3017F COLORECTAL CA SCREEN DOC REV: CPT | Performed by: FAMILY MEDICINE

## 2018-04-17 PROCEDURE — 4040F PNEUMOC VAC/ADMIN/RCVD: CPT | Performed by: FAMILY MEDICINE

## 2018-04-17 PROCEDURE — 1123F ACP DISCUSS/DSCN MKR DOCD: CPT | Performed by: FAMILY MEDICINE

## 2018-04-17 PROCEDURE — 99214 OFFICE O/P EST MOD 30 MIN: CPT | Performed by: FAMILY MEDICINE

## 2018-04-17 RX ORDER — BUPROPION HYDROCHLORIDE 300 MG/1
300 TABLET ORAL EVERY MORNING
Qty: 90 TABLET | Refills: 1 | Status: SHIPPED | OUTPATIENT
Start: 2018-04-17 | End: 2018-07-17 | Stop reason: SDUPTHER

## 2018-04-18 ENCOUNTER — PATIENT MESSAGE (OUTPATIENT)
Dept: FAMILY MEDICINE CLINIC | Age: 73
End: 2018-04-18

## 2018-04-18 DIAGNOSIS — F43.21 GRIEF: Primary | ICD-10-CM

## 2018-04-18 DIAGNOSIS — F32.0 MILD SINGLE CURRENT EPISODE OF MAJOR DEPRESSIVE DISORDER (HCC): ICD-10-CM

## 2018-04-18 ASSESSMENT — ENCOUNTER SYMPTOMS
RESPIRATORY NEGATIVE: 1
GASTROINTESTINAL NEGATIVE: 1
EYES NEGATIVE: 1

## 2018-04-27 ENCOUNTER — TELEPHONE (OUTPATIENT)
Dept: FAMILY MEDICINE CLINIC | Age: 73
End: 2018-04-27

## 2018-04-27 DIAGNOSIS — F32.0 MILD SINGLE CURRENT EPISODE OF MAJOR DEPRESSIVE DISORDER (HCC): ICD-10-CM

## 2018-05-16 DIAGNOSIS — I10 ESSENTIAL HYPERTENSION: ICD-10-CM

## 2018-05-16 DIAGNOSIS — E78.2 MIXED HYPERLIPIDEMIA: ICD-10-CM

## 2018-05-16 DIAGNOSIS — R73.9 HYPERGLYCEMIA: ICD-10-CM

## 2018-05-16 LAB
A/G RATIO: 2.6 (ref 1.1–2.2)
ALBUMIN SERPL-MCNC: 4.1 G/DL (ref 3.4–5)
ALP BLD-CCNC: 47 U/L (ref 40–129)
ALT SERPL-CCNC: 24 U/L (ref 10–40)
ANION GAP SERPL CALCULATED.3IONS-SCNC: 13 MMOL/L (ref 3–16)
AST SERPL-CCNC: 25 U/L (ref 15–37)
BILIRUB SERPL-MCNC: 0.9 MG/DL (ref 0–1)
BUN BLDV-MCNC: 17 MG/DL (ref 7–20)
CALCIUM SERPL-MCNC: 9 MG/DL (ref 8.3–10.6)
CHLORIDE BLD-SCNC: 109 MMOL/L (ref 99–110)
CHOLESTEROL, TOTAL: 121 MG/DL (ref 0–199)
CO2: 25 MMOL/L (ref 21–32)
CREAT SERPL-MCNC: 1 MG/DL (ref 0.8–1.3)
GFR AFRICAN AMERICAN: >60
GFR NON-AFRICAN AMERICAN: >60
GLOBULIN: 1.6 G/DL
GLUCOSE BLD-MCNC: 90 MG/DL (ref 70–99)
HDLC SERPL-MCNC: 55 MG/DL (ref 40–60)
LDL CHOLESTEROL CALCULATED: 56 MG/DL
POTASSIUM SERPL-SCNC: 3.9 MMOL/L (ref 3.5–5.1)
SODIUM BLD-SCNC: 147 MMOL/L (ref 136–145)
TOTAL PROTEIN: 5.7 G/DL (ref 6.4–8.2)
TRIGL SERPL-MCNC: 51 MG/DL (ref 0–150)
VLDLC SERPL CALC-MCNC: 10 MG/DL

## 2018-05-17 LAB
ESTIMATED AVERAGE GLUCOSE: 111.2 MG/DL
HBA1C MFR BLD: 5.5 %

## 2018-06-20 ENCOUNTER — OFFICE VISIT (OUTPATIENT)
Dept: FAMILY MEDICINE CLINIC | Age: 73
End: 2018-06-20

## 2018-06-20 VITALS
SYSTOLIC BLOOD PRESSURE: 120 MMHG | TEMPERATURE: 98.3 F | BODY MASS INDEX: 26.73 KG/M2 | DIASTOLIC BLOOD PRESSURE: 70 MMHG | WEIGHT: 184.2 LBS

## 2018-06-20 DIAGNOSIS — E78.2 MIXED HYPERLIPIDEMIA: ICD-10-CM

## 2018-06-20 DIAGNOSIS — I10 ESSENTIAL HYPERTENSION: ICD-10-CM

## 2018-06-20 DIAGNOSIS — I25.10 CORONARY ARTERY DISEASE INVOLVING NATIVE CORONARY ARTERY OF NATIVE HEART WITHOUT ANGINA PECTORIS: ICD-10-CM

## 2018-06-20 DIAGNOSIS — F32.0 MILD SINGLE CURRENT EPISODE OF MAJOR DEPRESSIVE DISORDER (HCC): ICD-10-CM

## 2018-06-20 DIAGNOSIS — Z12.11 ENCOUNTER FOR SCREENING COLONOSCOPY: ICD-10-CM

## 2018-06-20 DIAGNOSIS — R73.03 PRE-DIABETES: ICD-10-CM

## 2018-06-20 DIAGNOSIS — D18.00 HEMANGIOMA: Primary | ICD-10-CM

## 2018-06-20 LAB
BILIRUBIN, POC: NORMAL
BLOOD URINE, POC: NORMAL
CLARITY, POC: CLEAR
COLOR, POC: YELLOW
CREATININE URINE POCT: NORMAL
GLUCOSE URINE, POC: NORMAL
KETONES, POC: NORMAL
LEUKOCYTE EST, POC: NORMAL
MICROALBUMIN/CREAT 24H UR: NORMAL MG/G{CREAT}
MICROALBUMIN/CREAT UR-RTO: NORMAL
NITRITE, POC: NORMAL
PH, POC: 6
PROTEIN, POC: NORMAL
SPECIFIC GRAVITY, POC: 1.01
UROBILINOGEN, POC: 0.2

## 2018-06-20 PROCEDURE — 4040F PNEUMOC VAC/ADMIN/RCVD: CPT | Performed by: FAMILY MEDICINE

## 2018-06-20 PROCEDURE — 81002 URINALYSIS NONAUTO W/O SCOPE: CPT | Performed by: FAMILY MEDICINE

## 2018-06-20 PROCEDURE — 1036F TOBACCO NON-USER: CPT | Performed by: FAMILY MEDICINE

## 2018-06-20 PROCEDURE — 1123F ACP DISCUSS/DSCN MKR DOCD: CPT | Performed by: FAMILY MEDICINE

## 2018-06-20 PROCEDURE — G8417 CALC BMI ABV UP PARAM F/U: HCPCS | Performed by: FAMILY MEDICINE

## 2018-06-20 PROCEDURE — G8598 ASA/ANTIPLAT THER USED: HCPCS | Performed by: FAMILY MEDICINE

## 2018-06-20 PROCEDURE — 99214 OFFICE O/P EST MOD 30 MIN: CPT | Performed by: FAMILY MEDICINE

## 2018-06-20 PROCEDURE — 82044 UR ALBUMIN SEMIQUANTITATIVE: CPT | Performed by: FAMILY MEDICINE

## 2018-06-20 PROCEDURE — 3017F COLORECTAL CA SCREEN DOC REV: CPT | Performed by: FAMILY MEDICINE

## 2018-06-20 PROCEDURE — G8427 DOCREV CUR MEDS BY ELIG CLIN: HCPCS | Performed by: FAMILY MEDICINE

## 2018-06-20 ASSESSMENT — ENCOUNTER SYMPTOMS
RESPIRATORY NEGATIVE: 1
GASTROINTESTINAL NEGATIVE: 1
EYES NEGATIVE: 1

## 2018-06-26 ENCOUNTER — TELEPHONE (OUTPATIENT)
Dept: FAMILY MEDICINE CLINIC | Age: 73
End: 2018-06-26

## 2018-06-26 ENCOUNTER — NURSE ONLY (OUTPATIENT)
Dept: FAMILY MEDICINE CLINIC | Age: 73
End: 2018-06-26

## 2018-06-26 DIAGNOSIS — Z12.11 ENCOUNTER FOR SCREENING COLONOSCOPY: ICD-10-CM

## 2018-06-26 LAB
CONTROL: NORMAL
HEMOCCULT STL QL: NORMAL

## 2018-06-26 PROCEDURE — G0328 FECAL BLOOD SCRN IMMUNOASSAY: HCPCS | Performed by: FAMILY MEDICINE

## 2018-07-17 ENCOUNTER — OFFICE VISIT (OUTPATIENT)
Dept: FAMILY MEDICINE CLINIC | Age: 73
End: 2018-07-17

## 2018-07-17 VITALS
BODY MASS INDEX: 26.44 KG/M2 | TEMPERATURE: 97.9 F | SYSTOLIC BLOOD PRESSURE: 136 MMHG | DIASTOLIC BLOOD PRESSURE: 82 MMHG | WEIGHT: 182.2 LBS

## 2018-07-17 DIAGNOSIS — F32.0 MILD SINGLE CURRENT EPISODE OF MAJOR DEPRESSIVE DISORDER (HCC): ICD-10-CM

## 2018-07-17 DIAGNOSIS — J02.8 ACUTE PHARYNGITIS DUE TO OTHER SPECIFIED ORGANISMS: ICD-10-CM

## 2018-07-17 DIAGNOSIS — I10 ESSENTIAL HYPERTENSION: ICD-10-CM

## 2018-07-17 DIAGNOSIS — I25.10 CORONARY ARTERY DISEASE INVOLVING NATIVE CORONARY ARTERY OF NATIVE HEART WITHOUT ANGINA PECTORIS: ICD-10-CM

## 2018-07-17 DIAGNOSIS — E78.2 MIXED HYPERLIPIDEMIA: Primary | ICD-10-CM

## 2018-07-17 PROCEDURE — 4040F PNEUMOC VAC/ADMIN/RCVD: CPT | Performed by: FAMILY MEDICINE

## 2018-07-17 PROCEDURE — G8417 CALC BMI ABV UP PARAM F/U: HCPCS | Performed by: FAMILY MEDICINE

## 2018-07-17 PROCEDURE — G8598 ASA/ANTIPLAT THER USED: HCPCS | Performed by: FAMILY MEDICINE

## 2018-07-17 PROCEDURE — 1101F PT FALLS ASSESS-DOCD LE1/YR: CPT | Performed by: FAMILY MEDICINE

## 2018-07-17 PROCEDURE — 1036F TOBACCO NON-USER: CPT | Performed by: FAMILY MEDICINE

## 2018-07-17 PROCEDURE — 1123F ACP DISCUSS/DSCN MKR DOCD: CPT | Performed by: FAMILY MEDICINE

## 2018-07-17 PROCEDURE — G8427 DOCREV CUR MEDS BY ELIG CLIN: HCPCS | Performed by: FAMILY MEDICINE

## 2018-07-17 PROCEDURE — 3017F COLORECTAL CA SCREEN DOC REV: CPT | Performed by: FAMILY MEDICINE

## 2018-07-17 PROCEDURE — 99214 OFFICE O/P EST MOD 30 MIN: CPT | Performed by: FAMILY MEDICINE

## 2018-07-17 RX ORDER — BUPROPION HYDROCHLORIDE 300 MG/1
300 TABLET ORAL EVERY MORNING
Qty: 90 TABLET | Refills: 0 | Status: SHIPPED | OUTPATIENT
Start: 2018-07-17 | End: 2020-01-30

## 2018-07-17 RX ORDER — FENOFIBRATE 160 MG/1
160 TABLET ORAL DAILY
Qty: 90 TABLET | Refills: 0 | Status: SHIPPED | OUTPATIENT
Start: 2018-07-17 | End: 2020-01-30

## 2018-07-17 RX ORDER — ATORVASTATIN CALCIUM 80 MG/1
80 TABLET, FILM COATED ORAL DAILY
Qty: 90 TABLET | Refills: 0 | Status: SHIPPED | OUTPATIENT
Start: 2018-07-17

## 2018-07-17 RX ORDER — NITROGLYCERIN 0.3 MG/1
0.3 TABLET SUBLINGUAL EVERY 5 MIN PRN
Qty: 30 TABLET | Refills: 3 | Status: SHIPPED | OUTPATIENT
Start: 2018-07-17

## 2018-07-17 ASSESSMENT — ENCOUNTER SYMPTOMS
GASTROINTESTINAL NEGATIVE: 1
EYES NEGATIVE: 1
RESPIRATORY NEGATIVE: 1

## 2018-07-18 NOTE — PROGRESS NOTES
7/17/18    Arielle Tamayo  1945      Chief Complaint   Patient presents with    Hypertension     f/u    URI     body aches, fatigue, coughing, congestion, started last night, ear pressure     Hypertension: Patient here for follow-up of elevated blood pressure. He is exercising and is adherent to low salt diet. Blood pressure is well controlled at home. Cardiac symptoms none. Patient denies chest pain, dyspnea and fatigue. Cardiovascular risk factors: dyslipidemia and hypertension. Use of agents associated with hypertension: none. History of target organ damage: none. Dyslipidemia: Patient presents for evaluation of lipids. Compliance with treatment thus far has been excellent. A repeat fasting lipid profile was done. The patient does not use medications that may worsen dyslipidemias (corticosteroids, progestins, anabolic steroids, diuretics, beta-blockers, amiodarone, cyclosporine, olanzapine). The patient exercises intermittently. The patient is  known to have coexisting coronary artery disease. Depression: Patient complains of depression. He complains of anhedonia, depressed mood, difficulty concentrating and feelings of worthlessness/guilt. Onset was approximately several years ago, controlled since that time. He denies current suicidal and homicidal plan or intent. Family history significant for no psychiatric illness. Possible organic causes contributing are: none. Risk factors: negative life event wife's illness and previous episode of depression Previous treatment includes Wellbutrin and none. He complains of the following side effects from the treatment: none. Upper Respiratory Infection: Patient complains of symptoms of a URI. Symptoms include . Onset of symptoms was   ago, unchanged since that time. He also c/o non productive cough and post nasal drip for the past 3 days . He is drinking plenty of fluids. Evaluation to date: none.  Treatment to date: oral membranes are normal.   Eyes: Conjunctivae and EOM are normal. Pupils are equal, round, and reactive to light. Right eye exhibits no discharge. Left eye exhibits no discharge. No scleral icterus. Neck: Trachea normal and normal range of motion. Neck supple. No JVD present. No tracheal deviation present. No thyromegaly present. Cardiovascular: Normal rate, regular rhythm, normal heart sounds and intact distal pulses. Exam reveals no gallop and no friction rub. No murmur heard. Pulses:       Dorsalis pedis pulses are 2+ on the right side, and 2+ on the left side. Posterior tibial pulses are 2+ on the right side, and 2+ on the left side. Pulmonary/Chest: Effort normal and breath sounds normal. No stridor. No respiratory distress. He has no decreased breath sounds. He has no wheezes. He has no rhonchi. He has no rales. He exhibits no tenderness. Abdominal: Soft. Bowel sounds are normal. He exhibits no distension and no mass. There is no hepatosplenomegaly. There is no tenderness. There is no rebound and no guarding. No hernia. Lymphadenopathy:     He has no cervical adenopathy. Skin: He is not diaphoretic. Psychiatric: He has a normal mood and affect. His behavior is normal. Judgment and thought content normal.          Diagnosis Orders   1. Mixed hyperlipidemia   Condition stable continue the medications, treatments, will check labs as appropriate       The patient received counseling on the following healthy behaviors: nutrition, exercise, medication adherence and decrease in alcohol consumption    Patient given educational materials on Hyperlipidemia and Nutrition if appropriate    I have instructed the patient to complete a self tracking handout on diet and instructed them to bring it with them to the  next appointment. Discussed use, benefit, and side effects of prescribed medications. Barriers to medication compliance addressed. All patient questions answered.   Pt voiced

## 2018-07-20 ENCOUNTER — TELEPHONE (OUTPATIENT)
Dept: FAMILY MEDICINE CLINIC | Age: 73
End: 2018-07-20

## 2018-07-20 DIAGNOSIS — F32.0 MILD SINGLE CURRENT EPISODE OF MAJOR DEPRESSIVE DISORDER (HCC): ICD-10-CM

## 2018-07-20 LAB — THROAT CULTURE: NORMAL

## 2018-07-20 RX ORDER — BUPROPION HYDROCHLORIDE 150 MG/1
150 TABLET ORAL EVERY MORNING
Qty: 90 TABLET | Refills: 1 | Status: SHIPPED | OUTPATIENT
Start: 2018-07-20

## 2018-07-20 RX ORDER — AMOXICILLIN AND CLAVULANATE POTASSIUM 875; 125 MG/1; MG/1
1 TABLET, FILM COATED ORAL 2 TIMES DAILY
Qty: 20 TABLET | Refills: 0 | Status: SHIPPED | OUTPATIENT
Start: 2018-07-20 | End: 2018-07-30

## 2018-07-20 NOTE — TELEPHONE ENCOUNTER
Patient called to let  know he was in earlier this week with a cold and it has gotten much worse  He has been taking tylenol for a low fever  His throat now is very sore and he's coughing up a lot of clear and bubbly/foamy phlem

## 2019-01-28 ENCOUNTER — OFFICE VISIT (OUTPATIENT)
Dept: DERMATOLOGY | Age: 74
End: 2019-01-28
Payer: MEDICARE

## 2019-01-28 VITALS — BODY MASS INDEX: 26.5 KG/M2 | WEIGHT: 182.6 LBS

## 2019-01-28 DIAGNOSIS — L30.9 DERMATITIS: ICD-10-CM

## 2019-01-28 DIAGNOSIS — D48.5 NEOPLASM OF UNCERTAIN BEHAVIOR OF SKIN: ICD-10-CM

## 2019-01-28 DIAGNOSIS — L82.1 SEBORRHEIC KERATOSIS: ICD-10-CM

## 2019-01-28 DIAGNOSIS — D22.9 MULTIPLE NEVI: Primary | ICD-10-CM

## 2019-01-28 PROCEDURE — G8427 DOCREV CUR MEDS BY ELIG CLIN: HCPCS | Performed by: DERMATOLOGY

## 2019-01-28 PROCEDURE — G8484 FLU IMMUNIZE NO ADMIN: HCPCS | Performed by: DERMATOLOGY

## 2019-01-28 PROCEDURE — 4040F PNEUMOC VAC/ADMIN/RCVD: CPT | Performed by: DERMATOLOGY

## 2019-01-28 PROCEDURE — 3017F COLORECTAL CA SCREEN DOC REV: CPT | Performed by: DERMATOLOGY

## 2019-01-28 PROCEDURE — 99213 OFFICE O/P EST LOW 20 MIN: CPT | Performed by: DERMATOLOGY

## 2019-01-28 PROCEDURE — 11102 TANGNTL BX SKIN SINGLE LES: CPT | Performed by: DERMATOLOGY

## 2019-01-28 PROCEDURE — G8598 ASA/ANTIPLAT THER USED: HCPCS | Performed by: DERMATOLOGY

## 2019-01-28 PROCEDURE — 1123F ACP DISCUSS/DSCN MKR DOCD: CPT | Performed by: DERMATOLOGY

## 2019-01-28 PROCEDURE — G8417 CALC BMI ABV UP PARAM F/U: HCPCS | Performed by: DERMATOLOGY

## 2019-01-28 PROCEDURE — 1036F TOBACCO NON-USER: CPT | Performed by: DERMATOLOGY

## 2019-01-28 PROCEDURE — 1101F PT FALLS ASSESS-DOCD LE1/YR: CPT | Performed by: DERMATOLOGY

## 2019-01-31 LAB — DERMATOLOGY PATHOLOGY REPORT: ABNORMAL

## 2019-02-01 DIAGNOSIS — D04.9 BOWEN'S DISEASE: Primary | ICD-10-CM

## 2019-02-06 ENCOUNTER — PROCEDURE VISIT (OUTPATIENT)
Dept: SURGERY | Age: 74
End: 2019-02-06
Payer: MEDICARE

## 2019-02-06 VITALS
WEIGHT: 183 LBS | DIASTOLIC BLOOD PRESSURE: 69 MMHG | TEMPERATURE: 98.1 F | SYSTOLIC BLOOD PRESSURE: 105 MMHG | OXYGEN SATURATION: 99 % | HEART RATE: 62 BPM | BODY MASS INDEX: 26.56 KG/M2

## 2019-02-06 DIAGNOSIS — D04.39 SQUAMOUS CELL CARCINOMA IN SITU (SCCIS) OF SKIN OF FOREHEAD: Primary | ICD-10-CM

## 2019-02-06 PROCEDURE — 12052 INTMD RPR FACE/MM 2.6-5.0 CM: CPT | Performed by: DERMATOLOGY

## 2019-02-06 PROCEDURE — 17311 MOHS 1 STAGE H/N/HF/G: CPT | Performed by: DERMATOLOGY

## 2019-02-07 ENCOUNTER — TELEPHONE (OUTPATIENT)
Dept: SURGERY | Age: 74
End: 2019-02-07

## 2019-06-05 ENCOUNTER — TELEPHONE (OUTPATIENT)
Dept: DERMATOLOGY | Age: 74
End: 2019-06-05

## 2019-06-05 NOTE — TELEPHONE ENCOUNTER
Patient c/o lump underneath skin on L side of nose, at bottom,   Does not complain of any pain, but it is bothersome,     When can he be seen? Please review.  Thank you   Patient's phone number: [phone]

## 2019-06-10 ENCOUNTER — TELEPHONE (OUTPATIENT)
Dept: DERMATOLOGY | Age: 74
End: 2019-06-10

## 2019-06-10 NOTE — TELEPHONE ENCOUNTER
Patient states he did not listen to his voicemail but wanted to see who tried to reach him.   Appointment on 6/11/19 confirmed w/patient    Patient states to always use his mobile number  and not his home#

## 2019-06-11 ENCOUNTER — OFFICE VISIT (OUTPATIENT)
Dept: DERMATOLOGY | Age: 74
End: 2019-06-11
Payer: MEDICARE

## 2019-06-11 DIAGNOSIS — L72.3 INFLAMED EPIDERMOID CYST OF SKIN: Primary | ICD-10-CM

## 2019-06-11 PROCEDURE — G8598 ASA/ANTIPLAT THER USED: HCPCS | Performed by: DERMATOLOGY

## 2019-06-11 PROCEDURE — G8417 CALC BMI ABV UP PARAM F/U: HCPCS | Performed by: DERMATOLOGY

## 2019-06-11 PROCEDURE — 1036F TOBACCO NON-USER: CPT | Performed by: DERMATOLOGY

## 2019-06-11 PROCEDURE — 1123F ACP DISCUSS/DSCN MKR DOCD: CPT | Performed by: DERMATOLOGY

## 2019-06-11 PROCEDURE — 3017F COLORECTAL CA SCREEN DOC REV: CPT | Performed by: DERMATOLOGY

## 2019-06-11 PROCEDURE — 4040F PNEUMOC VAC/ADMIN/RCVD: CPT | Performed by: DERMATOLOGY

## 2019-06-11 PROCEDURE — G8427 DOCREV CUR MEDS BY ELIG CLIN: HCPCS | Performed by: DERMATOLOGY

## 2019-06-11 PROCEDURE — 99212 OFFICE O/P EST SF 10 MIN: CPT | Performed by: DERMATOLOGY

## 2019-06-11 PROCEDURE — 11900 INJECT SKIN LESIONS </W 7: CPT | Performed by: DERMATOLOGY

## 2019-06-20 NOTE — TELEPHONE ENCOUNTER
? Epidermoid cyst - very difficult to see/feel  - no lesion to biopsy today  - discussed that this may be easier to definitively diagnose with growth but can consider ILK, monitoring or oral abx if worsening - he'd like to try [address] first    Steroid injections can take up to 4 wks and to go ahead and call an ENT in the meantime so they can look from the inside. Called patient to relay instructions regarding patient's concerns and he needs to call back due to he was on the phone with his insurance company.

## 2019-06-20 NOTE — TELEPHONE ENCOUNTER
Patient called and the shots that Dr. Dave Bundy gave him are not working in his nose. The cysts feels it is getting bigger.      Call back# [phone]

## 2020-01-30 ENCOUNTER — OFFICE VISIT (OUTPATIENT)
Dept: DERMATOLOGY | Age: 75
End: 2020-01-30
Payer: MEDICARE

## 2020-01-30 PROCEDURE — G8417 CALC BMI ABV UP PARAM F/U: HCPCS | Performed by: DERMATOLOGY

## 2020-01-30 PROCEDURE — 17000 DESTRUCT PREMALG LESION: CPT | Performed by: DERMATOLOGY

## 2020-01-30 PROCEDURE — G8427 DOCREV CUR MEDS BY ELIG CLIN: HCPCS | Performed by: DERMATOLOGY

## 2020-01-30 PROCEDURE — 99214 OFFICE O/P EST MOD 30 MIN: CPT | Performed by: DERMATOLOGY

## 2020-01-30 PROCEDURE — 1123F ACP DISCUSS/DSCN MKR DOCD: CPT | Performed by: DERMATOLOGY

## 2020-01-30 PROCEDURE — 17003 DESTRUCT PREMALG LES 2-14: CPT | Performed by: DERMATOLOGY

## 2020-01-30 PROCEDURE — 4040F PNEUMOC VAC/ADMIN/RCVD: CPT | Performed by: DERMATOLOGY

## 2020-01-30 PROCEDURE — 1036F TOBACCO NON-USER: CPT | Performed by: DERMATOLOGY

## 2020-01-30 PROCEDURE — 3017F COLORECTAL CA SCREEN DOC REV: CPT | Performed by: DERMATOLOGY

## 2020-01-30 PROCEDURE — G8484 FLU IMMUNIZE NO ADMIN: HCPCS | Performed by: DERMATOLOGY

## 2020-01-30 NOTE — PROGRESS NOTES
cystic papule   L shin with 2 mm pink erosion with dry mildly erythematous skin surrounding    Assessment and Plan     1. Benign-appearing nevi and SKs  - educ re ABCD's of MM   educ sun protection   encouraged skin check yearly (sooner if indicated), self checks     2. Dermatitis-nummular and asteatotic - minimal activity today  - Discussed dry skin care  - Triamcinolone cream or oint twice daily to affected areas as needed for flares; discussed side effects and misuse    3. AK's - few new lesions today - L conchal bowl, L FH and R temple - 5 total  - 5 lesion(s) treated with liquid nitrogen x 2 cycles. Patient educated on risk of blister, hypopigmentation/scar and wound care. - cont sun protection    4. 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 Milia/tiny cyst - L neck - extracted (no extra charge) - rtn if growing/bothersome    6.  Erosion on the L shin - in center of mild dermatitis - ed DSC, TAC with bandage and then re-eval if not healing over the next 6 weeks

## 2021-02-02 ENCOUNTER — OFFICE VISIT (OUTPATIENT)
Dept: DERMATOLOGY | Age: 76
End: 2021-02-02
Payer: MEDICARE

## 2021-02-02 VITALS — TEMPERATURE: 98.4 F

## 2021-02-02 DIAGNOSIS — L57.0 AK (ACTINIC KERATOSIS): ICD-10-CM

## 2021-02-02 DIAGNOSIS — Z85.828 HISTORY OF NONMELANOMA SKIN CANCER: ICD-10-CM

## 2021-02-02 DIAGNOSIS — D48.5 NEOPLASM OF UNCERTAIN BEHAVIOR OF SKIN: ICD-10-CM

## 2021-02-02 DIAGNOSIS — L30.9 DERMATITIS: ICD-10-CM

## 2021-02-02 DIAGNOSIS — D22.9 MULTIPLE NEVI: Primary | ICD-10-CM

## 2021-02-02 DIAGNOSIS — L82.1 SEBORRHEIC KERATOSIS: ICD-10-CM

## 2021-02-02 PROCEDURE — 99213 OFFICE O/P EST LOW 20 MIN: CPT | Performed by: DERMATOLOGY

## 2021-02-02 PROCEDURE — G8421 BMI NOT CALCULATED: HCPCS | Performed by: DERMATOLOGY

## 2021-02-02 PROCEDURE — 3017F COLORECTAL CA SCREEN DOC REV: CPT | Performed by: DERMATOLOGY

## 2021-02-02 PROCEDURE — 1123F ACP DISCUSS/DSCN MKR DOCD: CPT | Performed by: DERMATOLOGY

## 2021-02-02 PROCEDURE — 17004 DESTROY PREMAL LESIONS 15/>: CPT | Performed by: DERMATOLOGY

## 2021-02-02 PROCEDURE — 11102 TANGNTL BX SKIN SINGLE LES: CPT | Performed by: DERMATOLOGY

## 2021-02-02 PROCEDURE — G8484 FLU IMMUNIZE NO ADMIN: HCPCS | Performed by: DERMATOLOGY

## 2021-02-02 PROCEDURE — G8427 DOCREV CUR MEDS BY ELIG CLIN: HCPCS | Performed by: DERMATOLOGY

## 2021-02-02 PROCEDURE — 1036F TOBACCO NON-USER: CPT | Performed by: DERMATOLOGY

## 2021-02-02 PROCEDURE — 4040F PNEUMOC VAC/ADMIN/RCVD: CPT | Performed by: DERMATOLOGY

## 2021-02-02 RX ORDER — TRIAMCINOLONE ACETONIDE 1 MG/G
CREAM TOPICAL
Qty: 80 G | Refills: 1 | Status: SHIPPED | OUTPATIENT
Start: 2021-02-02

## 2021-02-02 NOTE — PROGRESS NOTES
[facility]  Ronak Orozco MD  [address]  1945    77 y.o. male     Date of Visit: 2/2/2021    Chief Complaint: moles, f/u AK's  Chief Complaint   Patient presents with    Skin Exam     Last seen: 1-2020  *his wife has parkinson's    History of Present Illness:    1. Here for evaluation of multiple asx pigmented lesions on the trunk and extremities, present for many years; no change in size/shape/color of any lesions; no bleeding lesions. No personal or family history of skin cancer. 2. History of eczema during childhood and more recent flares of nummular dermatitis  - cleared in the past with IM Kenalog and topical steroids. Has been using aveeno regularly and seems to help. Has used TAC cream intermittently for flares since last seen with excellent results - improves quickly. Has a few focal flares since last seen but overall clear now. 3. F/u AK's. Hew new concerns - face, upper lip and scalp. 4. F/u NMSC - Garcia's on the L FH - s/p Mohs 2-2019. No probs with this site or new concerns. 5. He has a persistent lesion on the R medial clavicle. Asx. He has no personal history or family history of skin cancer. No tanning beds. He wears sunscreen regularly. He had a lot of sun exposure as a child and numerous sunburns that he can recall. Review of Systems:  Gen: Feels well, good sense of health. Skin: No changing moles or lesions. No new rashes. Past Medical History, Family History, Surgical History, Medications and Allergies reviewed.     Past Medical History:   Diagnosis Date    Asthma     BPH     CAD (coronary artery disease)     Cancer ([facility] [address]     squamous cell carcinoma in situ    Depression     Hyperlipidemia     Hypertension     Mononucleosis        Past Surgical History:   Procedure Laterality Date    ANGIOPLASTY  2004    MOHS SURGERY  02/06/2019    TONSILLECTOMY AND ADENOIDECTOMY Outpatient Medications Marked as Taking for the 2/2/21 encounter (Office Visit) with Patricia Dolan MD   Medication Sig Dispense Refill    aspirin 81 MG tablet Take 81 mg by mouth daily      buPROPion (WELLBUTRIN XL) 150 MG extended release tablet TAKE 1 TABLET BY MOUTH EVERY MORNING 90 tablet 1    atorvastatin (LIPITOR) 80 MG tablet Take 1 tablet by mouth daily 90 tablet 0    nitroGLYCERIN (NITROSTAT) 0.3 MG SL tablet Place 1 tablet under the tongue every 5 minutes as needed for Chest pain up to max of 3 total doses. If no relief after 1 dose, call 911. 30 tablet 3    metoprolol tartrate (LOPRESSOR) 25 MG tablet Take 0.5 tablets by mouth daily 45 tablet 0    zoster recombinant adjuvanted vaccine (SHINGRIX) 50 MCG SUSR injection Give vaccine as directed 1 each 0    Flaxseed, Linseed, (FLAXSEED OIL) 1000 MG CAPS Take by mouth      mometasone (ELOCON) 0.1 % ointment Apply topically daily. 1 Tube 1    Multiple Vitamins-Minerals (THERAPEUTIC MULTIVITAMIN-MINERALS) tablet Take 1 tablet by mouth daily      triamcinolone (KENALOG) 0.1 % cream Apply to affected area BID PRN flares 450 g 1    Vitamin D (CHOLECALCIFEROL) 1000 UNITS CAPS capsule Take 2,000 Units by mouth daily         Allergies   Allergen Reactions    Ace Inhibitors      cough         Physical Examination     Gen, well-appearing  The following were examined and determined to be normal: Psych/Neuro, Conjunctivae/eyelids, Gums/teeth/lips, Neck, Breast/axilla/chest, Abdomen, LUE, Nails/digits and buttocks. RLE and LLE.    The following were examined and determined to be abnormal: Head/face, scalp, back, RUE  trunk and extremities with scattered brown macules and papules including triangular medium brown macule on the L instep - stable in appearance - photo from 1-2019  Skin mildly xerotic; no significant dermatitis  L upper FH with subtle scar - clear  Face, upper lip and vertex scalp - erythematous roughly scaled macules R medial clavicle with eroded pink macule    Assessment and Plan     1. Benign-appearing nevi and SKs  - educ re si/sx/ABCD's of    educ sun protection - OTC sunscreen with SPF 30-50+ recommended and reviewed usage  encouraged skin check yearly (sooner if indicated), self checks    2. Dermatitisnummular and asteatotic - minimal activity today  - Discussed dry skin care  - Triamcinolone cream or oint twice daily to affected areas as needed for flares; discussed side effects and misuse    3. AK's - face, upper lip, vertex scalp  - 16 lesion(s) treated with liquid nitrogen x 2 cycles. Patient educated on risk of blister, hypopigmentation/scar and wound care. - cont sun protection    4. 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 R lower neck above clavicular head - r/o BCC  - Shave biopsy performed after verbal consent obtained. Patient educated regarding risk of bleeding, infection, scar and educated on wound care. Skin cleansed with alcohol pad and site anesthetized with lido + epi. Aluminum chloride applied to site for hemostasis. Petrolatum ointment and bandage applied. Specimen bottle labeled with patient information and site and specimen sent to [facility].

## 2021-02-02 NOTE — PATIENT INSTRUCTIONS
Biopsy Wound Care Instructions    · Keep the bandage in place for 24 hours. · Cleanse the wound with mild soapy water daily  ? Gently dry the area. ? Apply Vaseline or petroleum jelly to the wound using a cotton tipped applicator. ? Cover with a clean bandage. ? Repeat this process until the biopsy site is healed. ? If you had stitches placed, continue treating the site until the stitches are removed. Remember to make an appointment to return to have your stitches removed by our staff. ? You may shower and bathe as usual.       ** Biopsy results generally take around 7 business days to come back. If you have not heard from us by then, please call the office at [phone] between 8AM and 4PM Monday through Friday. Cryosurgery (Freezing) Wound Care Instructions    AFTER THE PROCEDURE:   ? You will notice swelling and redness around the site. This is normal.   ? You may experience a sharp or sore feeling for the next several days. For this discomfort, you may take acetaminophen (Tylenol©). ? A blister may develop at the treated area, sometimes as soon as by the end of the day. After several days, the blister will subside and a scab will form. ? If the area is bumped or traumatized during the first few days following freezing, you may develop bleeding into the blister, forming a blood blister. This is nothing to be alarmed about. ? If the blister is tense, uncomfortable, or much larger than the site that was frozen, you may pop the blister along its edge with a sterile needle (boiled, heated under a flame, or cleaned with alcohol) to allow the fluid to drain out. If the blister does not bother you, no treatment is needed. ? Do NOT peel off the top of the blister roof. It will act as a dressing on top of your wound. WOUND CARE:   ? You may shower or bathe as usual, but avoid scrubbing the areas that have been frozen. ? Cleanse the site twice a day with mild soapy water, and then apply a thin film of white petrolatum (Vaseline©). ? You do not need to cover the area, but can if you prefer. ? Do NOT allow the site to become dry or crusted, or attempt to dry it out with rubbing alcohol or hydrogen peroxide. ? Continue this regimen until the area is pink and healed. Depending on the size and location of your cryosurgery site, healing may take 2 to 4 weeks. ? The area may continue to be pink for several weeks, and over the next few months may become darker or lighter than the surrounding skin. This may be a permanent change.    ?

## 2021-02-04 LAB — DERMATOLOGY PATHOLOGY REPORT: NORMAL

## 2022-02-14 ENCOUNTER — TELEPHONE (OUTPATIENT)
Dept: DERMATOLOGY | Age: 77
End: 2022-02-14

## 2022-02-14 NOTE — TELEPHONE ENCOUNTER
[phone] patient is requesting a return call to schedule appt for spots on neck, chin and forehead crusty and itching hx of melanoma. Please advise. Thank you!

## 2022-02-16 ENCOUNTER — OFFICE VISIT (OUTPATIENT)
Dept: DERMATOLOGY | Age: 77
End: 2022-02-16
Payer: MEDICARE

## 2022-02-16 VITALS — TEMPERATURE: 98.1 F

## 2022-02-16 DIAGNOSIS — L30.9 DERMATITIS: ICD-10-CM

## 2022-02-16 DIAGNOSIS — D22.9 MULTIPLE NEVI: Primary | ICD-10-CM

## 2022-02-16 DIAGNOSIS — Z85.828 HISTORY OF NONMELANOMA SKIN CANCER: ICD-10-CM

## 2022-02-16 DIAGNOSIS — L57.0 AK (ACTINIC KERATOSIS): ICD-10-CM

## 2022-02-16 DIAGNOSIS — L82.1 SEBORRHEIC KERATOSIS: ICD-10-CM

## 2022-02-16 PROCEDURE — G8484 FLU IMMUNIZE NO ADMIN: HCPCS | Performed by: DERMATOLOGY

## 2022-02-16 PROCEDURE — 17004 DESTROY PREMAL LESIONS 15/>: CPT | Performed by: DERMATOLOGY

## 2022-02-16 PROCEDURE — 1123F ACP DISCUSS/DSCN MKR DOCD: CPT | Performed by: DERMATOLOGY

## 2022-02-16 PROCEDURE — 1036F TOBACCO NON-USER: CPT | Performed by: DERMATOLOGY

## 2022-02-16 PROCEDURE — 99214 OFFICE O/P EST MOD 30 MIN: CPT | Performed by: DERMATOLOGY

## 2022-02-16 PROCEDURE — 3017F COLORECTAL CA SCREEN DOC REV: CPT | Performed by: DERMATOLOGY

## 2022-02-16 PROCEDURE — G8421 BMI NOT CALCULATED: HCPCS | Performed by: DERMATOLOGY

## 2022-02-16 PROCEDURE — 4040F PNEUMOC VAC/ADMIN/RCVD: CPT | Performed by: DERMATOLOGY

## 2022-02-16 PROCEDURE — G8427 DOCREV CUR MEDS BY ELIG CLIN: HCPCS | Performed by: DERMATOLOGY

## 2022-02-16 NOTE — PROGRESS NOTES
2/16/22 encounter (Office Visit) with Andreea Angeles MD   Medication Sig Dispense Refill    triamcinolone (KENALOG) 0.1 % cream Apply to affected area BID PRN flares 80 g 1    aspirin 81 MG tablet Take 81 mg by mouth daily      atorvastatin (LIPITOR) 80 MG tablet Take 1 tablet by mouth daily 90 tablet 0    nitroGLYCERIN (NITROSTAT) 0.3 MG SL tablet Place 1 tablet under the tongue every 5 minutes as needed for Chest pain up to max of 3 total doses. If no relief after 1 dose, call 911. 30 tablet 3    metoprolol tartrate (LOPRESSOR) 25 MG tablet Take 0.5 tablets by mouth daily 45 tablet 0    Flaxseed, Linseed, (FLAXSEED OIL) 1000 MG CAPS Take by mouth      albuterol sulfate HFA (PROVENTIL HFA) 108 (90 Base) MCG/ACT inhaler Inhale 2 puffs into the lungs every 6 hours as needed for Wheezing MAY SUBSTITUTE PER INSURANCE 1 Inhaler 1    Multiple Vitamins-Minerals (THERAPEUTIC MULTIVITAMIN-MINERALS) tablet Take 1 tablet by mouth daily      Vitamin D (CHOLECALCIFEROL) 1000 UNITS CAPS capsule Take 2,000 Units by mouth daily         Allergies   Allergen Reactions    Ace Inhibitors      cough         Physical Examination     Gen, well-appearing  FSE today    trunk and extremities with scattered brown macules and papules and pinkish smooth papules + triangular medium brown macule on the L instep - stable in appearance - photo from 1-2019  Skin mildly xerotic; scattered excoriations on the arms and chest/neckline; no other significant dermatitis  L upper FH with subtle scar - clear  Face, chest and vertex scalp - erythematous roughly scaled macules    Back lesions for reference          Assessment and Plan     1. Benign-appearing nevi and SKs  - educ re si/sx/ABCD's of MM   educ sun protection - OTC sunscreen with SPF 30-50+ recommended and reviewed usage  encouraged skin check yearly (sooner if indicated), self checks    2.   Dermatitisnummular and asteatotic - mild activity today  - Discussed dry skin care - encouraged daily use moisturizer  - Triamcinolone cream or oint twice daily to affected areas as needed for flares; discussed side effects and misuse    3. AK's - face, vertex scalp, chest  - 15 lesion(s) treated with liquid nitrogen x 2 cycles. Patient educated on risk of blister, hypopigmentation/scar and wound care. - cont sun protection  - discussed 5-FU for the face - deferred for now - consider next year depending on lesions/activity    4.  L upper FH - Garcia's - s/p Mohs 2-2019, no signs recurrence  - educ re ABCD's of MM   educ sun protection   encouraged skin check yearly (sooner if indicated), self checks

## 2023-10-12 ENCOUNTER — OFFICE VISIT (OUTPATIENT)
Dept: DERMATOLOGY | Age: 78
End: 2023-10-12

## 2023-10-12 DIAGNOSIS — Z85.828 HISTORY OF NONMELANOMA SKIN CANCER: ICD-10-CM

## 2023-10-12 DIAGNOSIS — L57.0 AK (ACTINIC KERATOSIS): ICD-10-CM

## 2023-10-12 DIAGNOSIS — D48.5 NEOPLASM OF UNCERTAIN BEHAVIOR OF SKIN: Primary | ICD-10-CM

## 2023-10-12 NOTE — PATIENT INSTRUCTIONS
Biopsy Wound Care Instructions    Keep the bandage in place for 24 hours. Cleanse the wound with mild soapy water daily  Gently dry the area. Apply Vaseline or petroleum jelly to the wound using a cotton tipped applicator. Cover with a clean bandage. Repeat this process until the biopsy site is healed. If you had stitches placed, continue treating the site until the stitches are removed. Remember to make an appointment to return to have your stitches removed by our staff. You may shower and bathe as usual.       ** Biopsy results generally take around 7 business days to come back. If you have not heard from us by then, please call the office at [phone]. *Please note that biopsy results are released to both the patient and physician at the same time in [address] [place] [address]. Please allow time for your physician to review the results. One of our staff members will reach out to you with the results and plan.

## 2023-10-12 NOTE — PROGRESS NOTES
[facility]  Fabiola Flores MD  [address]  1945    69 y.o. male     Date of Visit: 10/12/2023    Chief Complaint: moles, f/u AK's  Chief Complaint   Patient presents with    Skin Lesion     Last seen: 2-2022  *his wife has parkinson's  *has 2 grandkids - 7 and 5  *went to [facility] in 2023    History of Present Illness:    1. Here for lesion on the R cheek - present x few mos, scaling and peeling    2. Also with rough spots on the temples    Using cerave AM moisturizer with SPF and slightly better with this but not clearing. 3. F/u NMSC - Garcia's on the L FH - s/p Mohs 2-2019. No probs with this site. He has no personal history or family history of skin cancer. No tanning beds. He wears sunscreen regularly. He had a lot of sun exposure as a child and numerous sunburns that he can recall. Review of Systems:  Gen: Feels well, good sense of health. Skin: No changing moles or lesions. No new rashes. Past Medical History, Family History, Surgical History, Medications and Allergies reviewed. Past Medical History:   Diagnosis Date    Asthma     BPH     CAD (coronary artery disease)     Cancer ([facility])     squamous cell carcinoma in situ    Depression     Hyperlipidemia     Hypertension     Mononucleosis        Past Surgical History:   Procedure Laterality Date    ANGIOPLASTY  2004    MOHS SURGERY  02/06/2019    TONSILLECTOMY AND ADENOIDECTOMY         Outpatient Medications Marked as Taking for the 10/12/23 encounter (Office Visit) with Jermaine Zaldivar MD   Medication Sig Dispense Refill    triamcinolone (KENALOG) 0.1 % cream Apply to affected area BID PRN flares 80 g 1    aspirin 81 MG tablet Take 1 tablet by mouth daily      atorvastatin (LIPITOR) 80 MG tablet Take 1 tablet by mouth daily 90 tablet 0    nitroGLYCERIN (NITROSTAT) 0.3 MG SL tablet Place 1 tablet under the tongue every 5 minutes as needed for Chest pain up to max of 3 total doses.

## 2023-10-17 LAB — DERMATOLOGY PATHOLOGY REPORT: NORMAL

## 2023-10-23 ENCOUNTER — TELEPHONE (OUTPATIENT)
Dept: DERMATOLOGY | Age: 78
End: 2023-10-23

## 2023-10-23 NOTE — TELEPHONE ENCOUNTER
Pt states the Fluorouracil was never received by [facility].  Please resent to:    [address]  [phone]    Phone for [phone]

## 2023-10-24 ENCOUNTER — TELEPHONE (OUTPATIENT)
Dept: DERMATOLOGY | Age: 78
End: 2023-10-24

## 2023-10-24 NOTE — TELEPHONE ENCOUNTER
Submitted PA for Fluorouracil 5% cream  [facility] Key: [id number]   STATUS: Approved today  Your request has been approved. This approval authorizes your coverage from 01/01/2023 - 01/22/2024.

## 2023-11-29 ENCOUNTER — TELEPHONE (OUTPATIENT)
Dept: DERMATOLOGY | Age: 78
End: 2023-11-29

## 2023-11-29 NOTE — TELEPHONE ENCOUNTER
Pt is on Fluorouracil. He says that he has been taking it for two weeks. now. He says that his skin is now developing a film on it. He wants to know if he is at the point where he should stop taking hte medication?     Phone [phone]

## 2023-11-30 NOTE — TELEPHONE ENCOUNTER
Spoke to patient-discontinue efudex on right cheek/zygoma after two weeks of treatment. Area is red, angry and oozing at times. Patient scheduled for 1/30/2024 for efudex check up.

## 2024-01-30 ENCOUNTER — OFFICE VISIT (OUTPATIENT)
Dept: DERMATOLOGY | Age: 79
End: 2024-01-30

## 2024-01-30 DIAGNOSIS — D48.5 NEOPLASM OF UNCERTAIN BEHAVIOR OF SKIN: ICD-10-CM

## 2024-01-30 DIAGNOSIS — L30.9 DERMATITIS: ICD-10-CM

## 2024-01-30 DIAGNOSIS — D22.9 MULTIPLE NEVI: Primary | ICD-10-CM

## 2024-01-30 DIAGNOSIS — L82.1 SEBORRHEIC KERATOSIS: ICD-10-CM

## 2024-01-30 DIAGNOSIS — L57.0 AK (ACTINIC KERATOSIS): ICD-10-CM

## 2024-01-30 DIAGNOSIS — Z85.828 HISTORY OF NONMELANOMA SKIN CANCER: ICD-10-CM

## 2024-01-30 RX ORDER — MONTELUKAST SODIUM 10 MG/1
TABLET ORAL
COMMUNITY

## 2024-01-30 NOTE — PATIENT INSTRUCTIONS
Biopsy Wound Care Instructions    Keep the bandage in place for 24 hours.   Cleanse the wound with mild soapy water daily  Gently dry the area.  Apply Vaseline or petroleum jelly to the wound using a cotton tipped applicator.  Cover with a clean bandage.  Repeat this process until the biopsy site is healed.  If you had stitches placed, continue treating the site until the stitches are removed. Remember to make an appointment to return to have your stitches removed by our staff.  You may shower and bathe as usual.       ** Biopsy results generally take around 7 business days to come back.  If you have not heard from us by then, please call the office at [phone].    *Please note that biopsy results are released to both the patient and physician at the same time in [employer].  Please allow time for your physician to review the results.  One of our staff members will reach out to you with the results and plan.

## 2024-01-30 NOTE — PROGRESS NOTES
[facility]  Bina Zaldivar MD  [phone]      Celestine Alexis  1945    78 y.o. male     Date of Visit: 1/30/2024    Chief Complaint: moles, f/u AK's  Chief Complaint   Patient presents with    Skin Exam     Last seen:   *his wife has parkinson's    History of Present Illness:    1. Here for evaluation of multiple asx pigmented lesions on the trunk and extremities, present for many years; no change in size/shape/color of any lesions; no bleeding lesions.  Few lesions on the back noted to be checked - all asx.    No personal or family history of skin cancer.    2. History of eczema during childhood and more recent flares of nummular dermatitis  - cleared in the past with IM Kenalog and topical steroids.    Has been using aveeno regularly and seems to help.  Has used TAC cream intermittently for flares with excellent results - improves quickly.    Has a few focal flares since last seen but not using moisturizer.    3. F/u AK's.  Few new concerns - R thigh, scalp.  R cheek/zygoma - bx  - read as AK, focally transected at the deep margin - treated with 5-FU in fall 2023.  Had significant inflammation, erythema.  Healed well so far and appears clear.    4. F/u NMSC - Garcia's on the L FH - s/p Mohs 2-2019.  No probs with this site or new concerns.    5. He has a concerning pigmented lesion on the L FA.  Asx.     He has no personal history or family history of skin cancer.    No tanning beds.  He wears sunscreen regularly.  He had a lot of sun exposure as a child and numerous sunburns that he can recall.    Review of Systems:  Gen: Feels well, good sense of health.  Skin: No changing moles or lesions.  No new rashes.    Past Medical History, Family History, Surgical History, Medications and Allergies reviewed.    Past Medical History:   Diagnosis Date    Asthma     BPH     CAD (coronary artery disease)     Cancer ([facility])     squamous cell carcinoma in situ    Depression

## 2024-02-06 ENCOUNTER — TELEPHONE (OUTPATIENT)
Dept: DERMATOLOGY | Age: 79
End: 2024-02-06

## 2024-02-06 NOTE — TELEPHONE ENCOUNTER
Left forearm-inflamed benign keratosis  No further treatment is needed at this time.  Please call if any concerns, particularly if pain, bleeding or new lesion growing.

## 2025-02-03 ENCOUNTER — OFFICE VISIT (OUTPATIENT)
Age: 80
End: 2025-02-03
Payer: MEDICARE

## 2025-02-03 DIAGNOSIS — L30.9 DERMATITIS: ICD-10-CM

## 2025-02-03 DIAGNOSIS — L57.0 AK (ACTINIC KERATOSIS): ICD-10-CM

## 2025-02-03 DIAGNOSIS — Z85.828 HISTORY OF NONMELANOMA SKIN CANCER: ICD-10-CM

## 2025-02-03 DIAGNOSIS — L82.1 SEBORRHEIC KERATOSIS: ICD-10-CM

## 2025-02-03 DIAGNOSIS — D22.9 MULTIPLE NEVI: Primary | ICD-10-CM

## 2025-02-03 PROCEDURE — 17000 DESTRUCT PREMALG LESION: CPT | Performed by: DERMATOLOGY

## 2025-02-03 PROCEDURE — G8421 BMI NOT CALCULATED: HCPCS | Performed by: DERMATOLOGY

## 2025-02-03 PROCEDURE — 1123F ACP DISCUSS/DSCN MKR DOCD: CPT | Performed by: DERMATOLOGY

## 2025-02-03 PROCEDURE — 1036F TOBACCO NON-USER: CPT | Performed by: DERMATOLOGY

## 2025-02-03 PROCEDURE — 99213 OFFICE O/P EST LOW 20 MIN: CPT | Performed by: DERMATOLOGY

## 2025-02-03 PROCEDURE — 1160F RVW MEDS BY RX/DR IN RCRD: CPT | Performed by: DERMATOLOGY

## 2025-02-03 PROCEDURE — 1159F MED LIST DOCD IN RCRD: CPT | Performed by: DERMATOLOGY

## 2025-02-03 PROCEDURE — 99214 OFFICE O/P EST MOD 30 MIN: CPT | Performed by: DERMATOLOGY

## 2025-02-03 PROCEDURE — 17003 DESTRUCT PREMALG LES 2-14: CPT | Performed by: DERMATOLOGY

## 2025-02-03 PROCEDURE — G8427 DOCREV CUR MEDS BY ELIG CLIN: HCPCS | Performed by: DERMATOLOGY

## 2025-02-03 RX ORDER — TRIAMCINOLONE ACETONIDE 1 MG/G
CREAM TOPICAL
Qty: 454 G | Refills: 1 | Status: SHIPPED | OUTPATIENT
Start: 2025-02-03

## 2025-02-03 NOTE — PATIENT INSTRUCTIONS
DRY SKIN CARE    1. Do not take more than 1 shower or bath each day. Try to spend 10 minutes or less in the shower/bath.  Avoid hot showers as this can damage the skin and make the dryness worse.    2. Use a moisturizing or mild soap such Dove (for sensitive skin), Cetaphil (Cleansing Bar,Gentle Body Wash, Restoraderm Soothing Wash), CeraVe Hydrating Body Wash, Eucerin Skin Calming Body Wash, or Aveeno Daily Moisturizing Body Wash. Antibacterial soaps can be too drying.     3. Use soap only on limited areas (face, underarms, groin and buttocks). Try to avoid using soap on the arms, legs, trunk and back.    4. After showering, pat dry with a towel and generously apply a thick moisturizer all over.  Use a moisturizer every day even if you are not showering that particular day.    5. If you are able, apply the moisturizer a second time during the day as well.     6. If a prescription cream or ointment has been ordered for you, apply the prescription medication to affected areas after your bath/shower while the skin is still damp, then apply the moisturizer to the remainder of the skin.     7. When it comes to moisturizers, the thicker the better. Ointments (such as vaseline) are thicker than creams, and creams are thicker than lotions. Suggested over-the-counter moisturizers include:    Sarna lotion - anti-itch lotion  CeraVe Cream  Cetaphil Cream  Eucerin - sometimes too greasy

## 2025-02-03 NOTE — PROGRESS NOTES
Procedure Laterality Date    ANGIOPLASTY  2004    MOHS SURGERY  02/06/2019    TONSILLECTOMY AND ADENOIDECTOMY         Outpatient Medications Marked as Taking for the 2/3/25 encounter (Office Visit) with Bina Zaldivar MD   Medication Sig Dispense Refill    montelukast (SINGULAIR) 10 MG tablet TAKE 1 TABLET (10 MG) BY MOUTH EVERY DAY AS NEEDED.      triamcinolone (KENALOG) 0.1 % cream Apply to affected area BID PRN flares 80 g 1    aspirin 81 MG tablet Take 1 tablet by mouth daily      atorvastatin (LIPITOR) 80 MG tablet Take 1 tablet by mouth daily 90 tablet 0    nitroGLYCERIN (NITROSTAT) 0.3 MG SL tablet Place 1 tablet under the tongue every 5 minutes as needed for Chest pain up to max of 3 total doses. If no relief after 1 dose, call 911. 30 tablet 3    metoprolol tartrate (LOPRESSOR) 25 MG tablet Take 0.5 tablets by mouth daily 45 tablet 0    Flaxseed, Linseed, (FLAXSEED OIL) 1000 MG CAPS Take by mouth      Multiple Vitamins-Minerals (THERAPEUTIC MULTIVITAMIN-MINERALS) tablet Take 1 tablet by mouth daily      Vitamin D (CHOLECALCIFEROL) 1000 UNITS CAPS capsule Take 2 capsules by mouth daily         Allergies   Allergen Reactions    Ace Inhibitors      cough         Physical Examination     Gen, well-appearing  FSE today except for underwear covered areas    trunk and extremities with scattered brown macules and papules and pinkish smooth papules + triangular medium brown macule on the L instep - stable in appearance - photo from 1-2019  Skin mildly xerotic; scattered excoriations on the upper back and chest/neckline with scattered erythematous macules and mild xerosis  L upper FH with subtle scar - clear   temples and L upper FH w erythematous roughly scaled macules  R cheek clear and smooth          Back lesions for reference          Assessment and Plan     1.  Benign-appearing nevi and SKs  - Monitor for ABCD's of MM and si/sx of NMSC  Continue sun protection - OTC sunscreen with SPF 30-50+